# Patient Record
Sex: FEMALE | Race: WHITE | URBAN - METROPOLITAN AREA
[De-identification: names, ages, dates, MRNs, and addresses within clinical notes are randomized per-mention and may not be internally consistent; named-entity substitution may affect disease eponyms.]

---

## 2018-11-07 ENCOUNTER — EMERGENCY (EMERGENCY)
Facility: HOSPITAL | Age: 83
LOS: 0 days | Discharge: HOME | End: 2018-11-07
Attending: EMERGENCY MEDICINE | Admitting: EMERGENCY MEDICINE
Payer: MEDICARE

## 2018-11-07 VITALS
DIASTOLIC BLOOD PRESSURE: 65 MMHG | SYSTOLIC BLOOD PRESSURE: 144 MMHG | HEART RATE: 79 BPM | OXYGEN SATURATION: 98 % | TEMPERATURE: 98 F | RESPIRATION RATE: 16 BRPM

## 2018-11-07 VITALS
SYSTOLIC BLOOD PRESSURE: 172 MMHG | WEIGHT: 125 LBS | HEART RATE: 94 BPM | DIASTOLIC BLOOD PRESSURE: 75 MMHG | TEMPERATURE: 97 F | OXYGEN SATURATION: 93 % | RESPIRATION RATE: 15 BRPM | HEIGHT: 56 IN

## 2018-11-07 DIAGNOSIS — R11.10 VOMITING, UNSPECIFIED: ICD-10-CM

## 2018-11-07 DIAGNOSIS — T18.128A FOOD IN ESOPHAGUS CAUSING OTHER INJURY, INITIAL ENCOUNTER: ICD-10-CM

## 2018-11-07 DIAGNOSIS — K44.9 DIAPHRAGMATIC HERNIA WITHOUT OBSTRUCTION OR GANGRENE: ICD-10-CM
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
## 2018-11-07 LAB
ALBUMIN SERPL ELPH-MCNC: 4 G/DL — SIGNIFICANT CHANGE UP (ref 3.3–5)
ALP SERPL-CCNC: 86 U/L — SIGNIFICANT CHANGE UP (ref 40–120)
ALT FLD-CCNC: 25 U/L — SIGNIFICANT CHANGE UP (ref 12–78)
ANION GAP SERPL CALC-SCNC: 9 MMOL/L — SIGNIFICANT CHANGE UP (ref 5–17)
APTT BLD: 40.7 SEC — HIGH (ref 27.5–36.3)
AST SERPL-CCNC: 18 U/L — SIGNIFICANT CHANGE UP (ref 15–37)
BASOPHILS # BLD AUTO: 0.06 K/UL — SIGNIFICANT CHANGE UP (ref 0–0.2)
BASOPHILS NFR BLD AUTO: 0.9 % — SIGNIFICANT CHANGE UP (ref 0–2)
BILIRUB SERPL-MCNC: 0.4 MG/DL — SIGNIFICANT CHANGE UP (ref 0.2–1.2)
BUN SERPL-MCNC: 14 MG/DL — SIGNIFICANT CHANGE UP (ref 7–23)
CALCIUM SERPL-MCNC: 9.4 MG/DL — SIGNIFICANT CHANGE UP (ref 8.5–10.1)
CHLORIDE SERPL-SCNC: 111 MMOL/L — HIGH (ref 96–108)
CO2 SERPL-SCNC: 24 MMOL/L — SIGNIFICANT CHANGE UP (ref 22–31)
CREAT SERPL-MCNC: 0.89 MG/DL — SIGNIFICANT CHANGE UP (ref 0.5–1.3)
EOSINOPHIL # BLD AUTO: 0.02 K/UL — SIGNIFICANT CHANGE UP (ref 0–0.5)
EOSINOPHIL NFR BLD AUTO: 0.3 % — SIGNIFICANT CHANGE UP (ref 0–6)
GLUCOSE SERPL-MCNC: 167 MG/DL — HIGH (ref 70–99)
HCT VFR BLD CALC: 40.3 % — SIGNIFICANT CHANGE UP (ref 34.5–45)
HGB BLD-MCNC: 13.6 G/DL — SIGNIFICANT CHANGE UP (ref 11.5–15.5)
IMM GRANULOCYTES NFR BLD AUTO: 0.3 % — SIGNIFICANT CHANGE UP (ref 0–1.5)
INR BLD: 1.02 RATIO — SIGNIFICANT CHANGE UP (ref 0.88–1.16)
LYMPHOCYTES # BLD AUTO: 1.05 K/UL — SIGNIFICANT CHANGE UP (ref 1–3.3)
LYMPHOCYTES # BLD AUTO: 15.1 % — SIGNIFICANT CHANGE UP (ref 13–44)
MCHC RBC-ENTMCNC: 31.9 PG — SIGNIFICANT CHANGE UP (ref 27–34)
MCHC RBC-ENTMCNC: 33.7 GM/DL — SIGNIFICANT CHANGE UP (ref 32–36)
MCV RBC AUTO: 94.4 FL — SIGNIFICANT CHANGE UP (ref 80–100)
MONOCYTES # BLD AUTO: 0.29 K/UL — SIGNIFICANT CHANGE UP (ref 0–0.9)
MONOCYTES NFR BLD AUTO: 4.2 % — SIGNIFICANT CHANGE UP (ref 2–14)
NEUTROPHILS # BLD AUTO: 5.53 K/UL — SIGNIFICANT CHANGE UP (ref 1.8–7.4)
NEUTROPHILS NFR BLD AUTO: 79.2 % — HIGH (ref 43–77)
NRBC # BLD: 0 /100 WBCS — SIGNIFICANT CHANGE UP (ref 0–0)
PLATELET # BLD AUTO: 297 K/UL — SIGNIFICANT CHANGE UP (ref 150–400)
POTASSIUM SERPL-MCNC: 4 MMOL/L — SIGNIFICANT CHANGE UP (ref 3.5–5.3)
POTASSIUM SERPL-SCNC: 4 MMOL/L — SIGNIFICANT CHANGE UP (ref 3.5–5.3)
PROT SERPL-MCNC: 8 GM/DL — SIGNIFICANT CHANGE UP (ref 6–8.3)
PROTHROM AB SERPL-ACNC: 11.3 SEC — SIGNIFICANT CHANGE UP (ref 10–12.9)
RBC # BLD: 4.27 M/UL — SIGNIFICANT CHANGE UP (ref 3.8–5.2)
RBC # FLD: 12.8 % — SIGNIFICANT CHANGE UP (ref 10.3–14.5)
SODIUM SERPL-SCNC: 144 MMOL/L — SIGNIFICANT CHANGE UP (ref 135–145)
TROPONIN I SERPL-MCNC: <0.015 NG/ML — SIGNIFICANT CHANGE UP (ref 0.01–0.04)
WBC # BLD: 6.97 K/UL — SIGNIFICANT CHANGE UP (ref 3.8–10.5)
WBC # FLD AUTO: 6.97 K/UL — SIGNIFICANT CHANGE UP (ref 3.8–10.5)

## 2018-11-07 PROCEDURE — 71045 X-RAY EXAM CHEST 1 VIEW: CPT | Mod: 26

## 2018-11-07 PROCEDURE — 93010 ELECTROCARDIOGRAM REPORT: CPT

## 2018-11-07 PROCEDURE — 99285 EMERGENCY DEPT VISIT HI MDM: CPT

## 2018-11-07 RX ORDER — SODIUM CHLORIDE 9 MG/ML
1000 INJECTION INTRAMUSCULAR; INTRAVENOUS; SUBCUTANEOUS ONCE
Qty: 0 | Refills: 0 | Status: COMPLETED | OUTPATIENT
Start: 2018-11-07 | End: 2018-11-07

## 2018-11-07 RX ORDER — GLUCAGON INJECTION, SOLUTION 0.5 MG/.1ML
1 INJECTION, SOLUTION SUBCUTANEOUS ONCE
Qty: 0 | Refills: 0 | Status: COMPLETED | OUTPATIENT
Start: 2018-11-07 | End: 2018-11-07

## 2018-11-07 RX ORDER — ONDANSETRON 8 MG/1
4 TABLET, FILM COATED ORAL ONCE
Qty: 0 | Refills: 0 | Status: COMPLETED | OUTPATIENT
Start: 2018-11-07 | End: 2018-11-07

## 2018-11-07 RX ADMIN — GLUCAGON INJECTION, SOLUTION 1 MILLIGRAM(S): 0.5 INJECTION, SOLUTION SUBCUTANEOUS at 13:09

## 2018-11-07 RX ADMIN — ONDANSETRON 4 MILLIGRAM(S): 8 TABLET, FILM COATED ORAL at 13:13

## 2018-11-07 RX ADMIN — SODIUM CHLORIDE 1000 MILLILITER(S): 9 INJECTION INTRAMUSCULAR; INTRAVENOUS; SUBCUTANEOUS at 13:09

## 2018-11-07 NOTE — ED PROVIDER NOTE - TEMPLATE, MLM
In the next few weeks you may receive a Press Ganey survey regarding your most recent clinic visit with us.  Please take a few moments out of your day to accurately evaluate your visit.  We strive to provide you with the best medical care and hope you are happy with our services 100% of the time.  We consider any rating lower than a 9/10 unacceptable. If you believe you would rate our clinic less than this please let us know how we can improve.  Again, thank you for your time and we look forward to your next visit.           We want to give the best care possible. If you receive a Press Ganey survey from our office, please take the time to fill out the survey and return it in the envelope provided. Your feedback helps us know how we are doing and we really appreciate it. Thank you.      
General

## 2018-11-07 NOTE — ED ADULT NURSE REASSESSMENT NOTE - COMFORT CARE
3pm rounding complete, vitals done no other assistance needed
plan of care explained/warm blanket provided

## 2018-11-07 NOTE — ED ADULT NURSE NOTE - NSIMPLEMENTINTERV_GEN_ALL_ED
Implemented All Fall with Harm Risk Interventions:  [place] to call system. Call bell, personal items and telephone within reach. Instruct patient to call for assistance. Room bathroom lighting operational. Non-slip footwear when patient is off stretcher. Physically safe environment: no spills, clutter or unnecessary equipment. Stretcher in lowest position, wheels locked, appropriate side rails in place. Provide visual cue, wrist band, yellow gown, etc. Monitor gait and stability. Monitor for mental status changes and reorient to person, place, and time. Review medications for side effects contributing to fall risk. Reinforce activity limits and safety measures with patient and family. Provide visual clues: red socks.

## 2018-11-07 NOTE — ED PROVIDER NOTE - OBJECTIVE STATEMENT
88 y/o female with a PMHx of DM, hiatal hernia presents to the ED c/o emesis today. Pt states that over last few days pt has been unable to swallow after taking a few bites of food. Pt states it "feels like the esophagus and stomach is closed". Pt states if she rested several hours, she would then be able to eat again. However, this morning pt states she took two bites of her apricot for breakfast and was immediately not able to swallow. Pt then began to vomit the apricot and white fluid. Pt states she has had chest discomfort since last night. Pt is now infrequently spitting up in the ED and states she filled 3-4 cups of spitup prior to arrival. 88 y/o female with a PMHx of DM, hiatal hernia presents to the ED c/o emesis today. Pt states that over last few days pt has been unable to swallow after taking a few bites of food. Pt states it "feels like the esophagus and stomach is closed". Pt states if she rested several hours, she would then be able to eat again. However, this morning pt states she took two bites of her apricot for breakfast and was immediately not able to swallow. Pt then began to vomit the apricot and white fluid. Pt is now infrequently spitting up in the ED and states she filled 3-4 cups of spitup prior to arrival.  Hx of similar, was supposed to f/u w/ GI but has not.  No SOB, fever, AP, d/c.

## 2018-11-07 NOTE — ED ADULT NURSE NOTE - OBJECTIVE STATEMENT
pt was eating a apricot and felt a piece got stuck and vomited. Pt know having hard time swallowing and spitting up clear liquid.

## 2018-11-07 NOTE — ED ADULT NURSE REASSESSMENT NOTE - NS ED NURSE REASSESS COMMENT FT1
Pt states she can know swallow her saliva and was able to swallow water without vomiting. Dr. Sahu made aware. Will continue to monitor.

## 2018-11-07 NOTE — ED PROVIDER NOTE - NS_ ATTENDINGSCRIBEDETAILS _ED_A_ED_FT
The scribe's documentation has been prepared under my direction and personally reviewed by me in its entirety. I confirm that the note above accurately reflects all work, treatment, procedures, and medical decision-making performed by me.  Dany Gaming MD

## 2018-11-07 NOTE — ED STATDOCS - PROGRESS NOTE DETAILS
Rell OCHOA for Dr. Douglas: 88 y/o female with PMHx of hiatal hernia, DM on metformin presents to the ED c/o emesis starting around 7:15am. Pt states she has a hx of hiatal hernia a few years ago and since then when she eats she feels like the food cannot pass through. This morning at 7:15am she was eating an apricot and felt like the apricot would not go down. Since then pt has not been able to swallow anything and keeps on spitting up bile. GI- Dr. Malik. Will send pt to main ED for further evaluation.

## 2018-11-07 NOTE — ED STATDOCS - NS_ ATTENDINGSCRIBEDETAILS _ED_A_ED_FT
I, Tarun Douglas MD,  performed the initial face to face bedside interview with this patient regarding history of present illness, review of symptoms and relevant past medical, social and family history.  I completed an independent physical examination.    The history, relevant review of systems, past medical and surgical history, medical decision making, and physical examination was documented by the scribe in my presence and I attest to the accuracy of the documentation.

## 2020-10-10 ENCOUNTER — EMERGENCY (EMERGENCY)
Facility: HOSPITAL | Age: 85
LOS: 0 days | Discharge: HOME | End: 2020-10-10
Attending: EMERGENCY MEDICINE
Payer: MEDICARE

## 2020-10-10 VITALS
SYSTOLIC BLOOD PRESSURE: 146 MMHG | RESPIRATION RATE: 16 BRPM | OXYGEN SATURATION: 98 % | DIASTOLIC BLOOD PRESSURE: 78 MMHG | HEART RATE: 78 BPM | TEMPERATURE: 98 F

## 2020-10-10 VITALS — WEIGHT: 110.01 LBS | HEIGHT: 56 IN

## 2020-10-10 DIAGNOSIS — M17.0 BILATERAL PRIMARY OSTEOARTHRITIS OF KNEE: ICD-10-CM

## 2020-10-10 DIAGNOSIS — K44.9 DIAPHRAGMATIC HERNIA WITHOUT OBSTRUCTION OR GANGRENE: ICD-10-CM

## 2020-10-10 DIAGNOSIS — M19.011 PRIMARY OSTEOARTHRITIS, RIGHT SHOULDER: ICD-10-CM

## 2020-10-10 DIAGNOSIS — K62.3 RECTAL PROLAPSE: ICD-10-CM

## 2020-10-10 DIAGNOSIS — R73.03 PREDIABETES: ICD-10-CM

## 2020-10-10 PROCEDURE — 99282 EMERGENCY DEPT VISIT SF MDM: CPT

## 2020-10-10 PROCEDURE — 99283 EMERGENCY DEPT VISIT LOW MDM: CPT

## 2020-10-10 NOTE — ED ADULT NURSE NOTE - CHIEF COMPLAINT QUOTE
pt presents to ED c/o "ball came from her anus," when she tried to defecate this morning, possible rectal prolapse. Denies any abd pain, NVD.  [facility] [phone]

## 2020-10-10 NOTE — ED PROVIDER NOTE - OBJECTIVE STATEMENT
89 y/o female with PMHx of borderline diabetes, OA of R shoulder and knees, hiatal hernia presents to the ED c/o rectal prolapse. At 8:15 this morning, pt reports urgent need to defecate. Pt reports "part of my rectum came out, golf ball size." Reports she wiped blood for 15 minutes. She is not in any pain at this time. Last BM was yesterday. Denies fever, nausea, abdominal pain, recent weight loss, pain, decreased appetite, or recent constipation. Last PO intake 7:15 this morning. PCP: Dr. Tony Forrest. Pt is not on blood thinners or aspirin. NKDA. No recent known sick contacts.

## 2020-10-10 NOTE — ED ADULT TRIAGE NOTE - CHIEF COMPLAINT QUOTE
pt presents to ED c/o "ball came from her anus," when she tried to defecate this morning, possible rectal prolapse. Denies any abd pain, NVD.  [facility] [phone] atraumatic/normal range of motion/neck supple/motor intact

## 2020-10-10 NOTE — ED PROVIDER NOTE - PATIENT PORTAL LINK FT
You can access the FollowMyHealth Patient Portal offered by [facility] by registering at the following website: [email or web address]. By joining [employer]’s FollowMyHealth portal, you will also be able to view your health information using other applications (apps) compatible with our system.

## 2020-10-10 NOTE — ED PROVIDER NOTE - CLINICAL SUMMARY MEDICAL DECISION MAKING FREE TEXT BOX
Pt presents ambulatory to ED regarding rectal prolapse. +resolved, self reduced prior to ED evaluation. PE unremarkable. Pt without active complaint at present. Will d/c with instructions: increase fiber, laxative, PCP office f/u.

## 2020-10-10 NOTE — ED ADULT NURSE REASSESSMENT NOTE - NS ED NURSE REASSESS COMMENT FT1
pt provided indepth discharge instructions, questions encouraged and answered. pt provided MD information for follow up and verbalized understanding,  Nithya pt friend contacted and on way to  pt. pt stable with normal vitals discharged to home.

## 2020-10-10 NOTE — ED PROVIDER NOTE - NSFOLLOWUPINSTRUCTIONS_ED_ALL_ED_FT
Continue your regular medications as per routine.  Drink plenty of oral fluids.  Include more fiber in your diet.  Don't strain during BMs.  Avoid sitting for prolonged periods of time.  Follow up this upcoming week with [place]-Rectal specialist as listed below.  Follow up this upcoming week with your own regular doctor.      ED evaluation and management discussed with the patient and family (if available) in detail.  Close PMD follow up encouraged.  Strict ED return instructions discussed in detail and patient given the opportunity to ask any questions about their discharge diagnosis and instructions. Patient verbalized understanding.        Rectal Prolapse    WHAT YOU NEED TO KNOW:    A rectal prolapse is a condition that causes your rectum to come through your anus. The rectum is the end of your bowel. A prolapse may happen during a bowel movement. A prolapse may happen more often in women after childbirth or who are older than 50 years.     DISCHARGE INSTRUCTIONS:    Call 911 for any of the following:   •You have trouble breathing.       •Your heart is beating faster than usual.       Return to the emergency department if:   •You have severe pain in your abdomen.       •Your abdomen looks bigger than usual.       •You see a large amount of blood in your bowel movement.       •Blood from your rectum soaks through your underwear.       Contact your healthcare provider if:   •You have a fever.       •You have nausea or are vomiting.       •You see larger amounts of blood in your bowel movement than before.       •You have questions or concerns about your condition or care.       Medicines: You may need any of the following:   •Stool softeners help prevent constipation.       •Laxatives help your intestines relax and loosen to prevent constipation.       •Take your medicine as directed. Contact your healthcare provider if you think your medicine is not helping or if you have side effects. Tell him or her if you are allergic to any medicine. Keep a list of the medicines, vitamins, and herbs you take. Include the amounts, and when and why you take them. Bring the list or the pill bottles to follow-up visits. Carry your medicine list with you in case of an emergency.      Follow up with your healthcare provider as directed: Write down your questions so you remember to ask them during your visits.     How to do a manual reduction: Manual reduction is a procedure you can do to place your rectum back inside of the anus. Your healthcare provider will show you how to do a manual reduction. You may need a family member to help you with manual reduction. The following are general steps to follow. Your healthcare provider may give you specific steps to follow.   •Your healthcare provider may tell you to apply sugar to your rectum before manual reduction. This may help decrease the swelling of your rectum, and make it easier to put back inside your anus. Ask your healthcare provider about applying sugar to your rectum.       •Lie on your back with your knees bent.       •Wash your hands and put on gloves. Lubricate your glove with petroleum jelly.       •Hold your rectum on both sides of the anus. Gently apply firm, steady pressure on your rectum and push it into your anus. You may need to apply pressure for several minutes if the bowel is swollen. Inspect your anus. You can use a mirror or have your family member inspect your anus. You should not see the rectum. If a prolapse happens again, you can repeat manual reduction.       •You can hold the rectum in place with gauze and tape across your buttocks. Before you apply gauze, place a quarter size amount of petroleum jelly on the gauze. The petroleum jelly will prevent the gauze from sticking to your rectum. Remove the gauze as directed by your healthcare provider.       Prevent a rectal prolapse:   •Eat more high-fiber foods. This may help decrease constipation by adding bulk and softness to your bowel movements. Your healthcare provider can help you create a meal plan that includes high-fiber foods. High fiber foods include fruit, vegetables, whole-grain breads, and cooked beans.       •Increase the amount of liquid you drink. Liquids can help keep your bowel movements soft and prevent constipation. Ask your healthcare provider how much liquid you should drink each day.       •Exercise your pelvic muscles. Kegel exercises strengthen the pelvic muscles. These exercises involve tightening and relaxing vaginal and rectal muscles. Kegel exercises can make the rectal muscles stronger and improve bowel control. Ask your healthcare provider for more information on how to do kegel exercises.       •Do not sit for long amounts of time. You may put too much pressure on your anus. Pressure on your anus may cause a rectal prolapse.

## 2020-10-10 NOTE — ED ADULT NURSE NOTE - NSIMPLEMENTINTERV_GEN_ALL_ED
Implemented All Fall Risk Interventions:  [place] to call system. Call bell, personal items and telephone within reach. Instruct patient to call for assistance. Room bathroom lighting operational. Non-slip footwear when patient is off stretcher. Physically safe environment: no spills, clutter or unnecessary equipment. Stretcher in lowest position, wheels locked, appropriate side rails in place. Provide visual cue, wrist band, yellow gown, etc. Monitor gait and stability. Monitor for mental status changes and reorient to person, place, and time. Review medications for side effects contributing to fall risk. Reinforce activity limits and safety measures with patient and family.

## 2020-10-10 NOTE — ED ADULT NURSE NOTE - OBJECTIVE STATEMENT
pt presents to ED from home, pt alert and orientedx4, VSs afebrule, pt states she went to pass a bowel movment this AM and a mass of tissue protuded out of her rectum, pt states it did not fall out and she believes it may have gonne back in. pt states she has had protrusions of mass in the past but not as large. pt denies hemorrhoids. pt denies fall trauma fever. pt states there was a scant amount of blood when the mass protruded out, safety maintained will continue to monitor

## 2021-02-26 ENCOUNTER — EMERGENCY (EMERGENCY)
Facility: HOSPITAL | Age: 86
LOS: 0 days | Discharge: HOME | End: 2021-02-26
Attending: EMERGENCY MEDICINE
Payer: MEDICARE

## 2021-02-26 VITALS
DIASTOLIC BLOOD PRESSURE: 56 MMHG | TEMPERATURE: 98 F | SYSTOLIC BLOOD PRESSURE: 140 MMHG | RESPIRATION RATE: 18 BRPM | OXYGEN SATURATION: 100 % | HEART RATE: 84 BPM

## 2021-02-26 VITALS — WEIGHT: 106.92 LBS | HEIGHT: 56 IN

## 2021-02-26 DIAGNOSIS — K62.3 RECTAL PROLAPSE: ICD-10-CM

## 2021-02-26 DIAGNOSIS — R73.03 PREDIABETES: ICD-10-CM

## 2021-02-26 DIAGNOSIS — M19.90 UNSPECIFIED OSTEOARTHRITIS, UNSPECIFIED SITE: ICD-10-CM

## 2021-02-26 PROCEDURE — 99282 EMERGENCY DEPT VISIT SF MDM: CPT

## 2021-02-26 NOTE — ED PROVIDER NOTE - GASTROINTESTINAL, MLM
Abdomen soft, non-tender, no guarding. +bowel sounds. Rectal exam visualized, no prolapse, already reduced, no blood.

## 2021-02-26 NOTE — ED PROVIDER NOTE - CARE PROVIDERS DIRECT ADDRESSES
[email or web address]

## 2021-02-26 NOTE — ED PROVIDER NOTE - OBJECTIVE STATEMENT
89 y/o female with PMHx of arthritis, hiatal hernia, OA, borderline DM presents to the ED c/o rectal prolapse. Pt states this has happened twice in since last September. Has not headache BM in a few days, today attempt to have BM, and felt rectum prolapse as well, x1 hour ago. Attempted to push it back in, but was unable to. Denies any pain at this time, fever, abd pain, chest pain, no known COVID exposure, recent loss of smell or taste. PCP: Dr. Forrest. Pt does not follow with current GI.

## 2021-02-26 NOTE — ED PROVIDER NOTE - CONSTITUTIONAL, MLM
Well appearing elderly white female, awake, alert, oriented to person, place, time/situation and in no acute distress. normal...

## 2021-02-26 NOTE — ED PROVIDER NOTE - CARE PROVIDER_API CALL
Alex Madrid)  ColonRectal Surgery; Surgery  [address] [address]  Phone: [phone]  Fax: [phone]  Follow Up Time:     Francisco Alan)  ColonRectal Surgery; Surgery  [address], [address]  [place] [address]  Phone: [phone]  Fax: [phone]  Follow Up Time:

## 2021-02-26 NOTE — ED PROVIDER NOTE - CLINICAL SUMMARY MEDICAL DECISION MAKING FREE TEXT BOX
89 y/o female with history of alleged recurrent rectal prolapse x few months, though each episode self-resolved, ambulatory to ED c/o rectal prolapse, incurred during straining for BM. No abd pain, nausea, vomiting, no current rectal pain. On exam, prolapse self reduced, no bleeding, rectum non-tender. Plan: no acute intervention warranted, DC on cholase with constipation/rectal prolapse instructions, colorectal referral.

## 2021-02-26 NOTE — ED PROVIDER NOTE - ATTENDING CONTRIBUTION TO CARE
I, Daniel Austin MD, personally saw the patient with the PA, and completed the key components of the history and physical exam. I then discussed the management plan with the PA.

## 2021-02-26 NOTE — ED PROVIDER NOTE - NSFOLLOWUPINSTRUCTIONS_ED_ALL_ED_FT
Rectal Prolapse    WHAT YOU NEED TO KNOW:    A rectal prolapse is a condition that causes your rectum to come through your anus. The rectum is the end of your bowel. A prolapse may happen during a bowel movement. A prolapse may happen more often in women after childbirth or who are older than 50 years.     DISCHARGE INSTRUCTIONS:    Call 911 for any of the following:   •You have trouble breathing.       •Your heart is beating faster than usual.       Return to the emergency department if:   •You have severe pain in your abdomen.       •Your abdomen looks bigger than usual.       •You see a large amount of blood in your bowel movement.       •Blood from your rectum soaks through your underwear.       Contact your healthcare provider if:   •You have a fever.       •You have nausea or are vomiting.       •You see larger amounts of blood in your bowel movement than before.       •You have questions or concerns about your condition or care.       Medicines: You may need any of the following:   •Stool softeners help prevent constipation.       •Laxatives help your intestines relax and loosen to prevent constipation.       •Take your medicine as directed. Contact your healthcare provider if you think your medicine is not helping or if you have side effects. Tell him or her if you are allergic to any medicine. Keep a list of the medicines, vitamins, and herbs you take. Include the amounts, and when and why you take them. Bring the list or the pill bottles to follow-up visits. Carry your medicine list with you in case of an emergency.      Follow up with your healthcare provider as directed: Write down your questions so you remember to ask them during your visits.     How to do a manual reduction: Manual reduction is a procedure you can do to place your rectum back inside of the anus. Your healthcare provider will show you how to do a manual reduction. You may need a family member to help you with manual reduction. The following are general steps to follow. Your healthcare provider may give you specific steps to follow.   •Your healthcare provider may tell you to apply sugar to your rectum before manual reduction. This may help decrease the swelling of your rectum, and make it easier to put back inside your anus. Ask your healthcare provider about applying sugar to your rectum.       •Lie on your back with your knees bent.       •Wash your hands and put on gloves. Lubricate your glove with petroleum jelly.       •Hold your rectum on both sides of the anus. Gently apply firm, steady pressure on your rectum and push it into your anus. You may need to apply pressure for several minutes if the bowel is swollen. Inspect your anus. You can use a mirror or have your family member inspect your anus. You should not see the rectum. If a prolapse happens again, you can repeat manual reduction.       •You can hold the rectum in place with gauze and tape across your buttocks. Before you apply gauze, place a quarter size amount of petroleum jelly on the gauze. The petroleum jelly will prevent the gauze from sticking to your rectum. Remove the gauze as directed by your healthcare provider.       Prevent a rectal prolapse:   •Eat more high-fiber foods. This may help decrease constipation by adding bulk and softness to your bowel movements. Your healthcare provider can help you create a meal plan that includes high-fiber foods. High fiber foods include fruit, vegetables, whole-grain breads, and cooked beans.       •Increase the amount of liquid you drink. Liquids can help keep your bowel movements soft and prevent constipation. Ask your healthcare provider how much liquid you should drink each day.       •Exercise your pelvic muscles. Kegel exercises strengthen the pelvic muscles. These exercises involve tightening and relaxing vaginal and rectal muscles. Kegel exercises can make the rectal muscles stronger and improve bowel control. Ask your healthcare provider for more information on how to do kegel exercises.       •Do not sit for long amounts of time. You may put too much pressure on your anus. Pressure on your anus may cause a rectal prolapse.          Constipation    WHAT YOU NEED TO KNOW:    Constipation is when you have hard, dry bowel movements, or you go longer than usual between bowel movements.     DISCHARGE INSTRUCTIONS:    Call your doctor if:   •You have blood in your bowel movements.      •You have a fever and abdominal pain with the constipation.      •Your constipation gets worse.       •You start to vomit.      •You have questions or concerns about your condition or care.      Medicines:   •Medicine such as a laxative may help relax and loosen your intestines to help you have a bowel movement. Your provider may recommend you only use laxatives for a short time. Long-term use may make your bowels dependent on the medicine.      •Take your medicine as directed. Contact your healthcare provider if you think your medicine is not helping or if you have side effects. Tell him of her if you are allergic to any medicine. Keep a list of the medicines, vitamins, and herbs you take. Include the amounts, and when and why you take them. Bring the list or the pill bottles to follow-up visits. Carry your medicine list with you in case of an emergency.      Relieve constipation:   •A suppository may be used to help soften your bowel movements. This may make them easier to pass. A suppository is guided into your rectum through your anus.  Suppository for Constipation           •An enema is liquid medicine used to clear bowel movement from your rectum. The medicine is put into your rectum through your anus.  Enemas           Prevent constipation:   •Drink liquids as directed. You may need to drink extra liquids to help soften and move your bowels. Ask how much liquid to drink each day and which liquids are best for you.       •Eat high-fiber foods. This may help decrease constipation by adding bulk to your bowel movements. High-fiber foods include fruit, vegetables, whole-grain breads and cereals, and beans. Your healthcare provider or dietitian can help you create a high-fiber meal plan. Your provider may also recommend a fiber supplement if you cannot get enough fiber from food.              •Exercise regularly. Regular physical activity can help stimulate your intestines. Walking is a good exercise to prevent or relieve constipation. Ask which exercises are best for you.  Walking for Exercise           •Schedule a time each day to have a bowel movement. This may help train your body to have regular bowel movements. Bend forward while you are on the toilet to help move the bowel movement out. Sit on the toilet for at least 10 minutes, even if you do not have a bowel movement.       •Talk to your healthcare provider about your medicines. Certain medicines, such as opioids, can cause constipation. Your provider may be able to make medicine changes. For example, he or she may change the kind of medicine, or change when you take it.      Follow up with your healthcare provider as directed: Write down your questions so you remember to ask them during your visits.

## 2021-02-26 NOTE — ED ADULT TRIAGE NOTE - CHIEF COMPLAINT QUOTE
PT to ED from home for rectal prolapse. PT reports having this happen 2x since september. Denies pain.

## 2021-02-26 NOTE — ED PROVIDER NOTE - PROGRESS NOTE DETAILS
89 yo female presents with rectal prolapse. Pt states that she had this in the past and was given f/u with colorectal but was unable to follow up. Pt states she drank a lot of water this morning and had a large BM prior to the reoccurring rectal prolapse. Currently the prolapse have self resolved. Direction and plan given to pt and her information placed in the referral book to receive an earlier appointment for Colorectal. Pt aware and agrees with plan. -Tony Chavarria PA-C

## 2021-02-26 NOTE — ED ADULT NURSE NOTE - NSIMPLEMENTINTERV_GEN_ALL_ED
Implemented All Fall with Harm Risk Interventions:  [place] to call system. Call bell, personal items and telephone within reach. Instruct patient to call for assistance. Room bathroom lighting operational. Non-slip footwear when patient is off stretcher. Physically safe environment: no spills, clutter or unnecessary equipment. Stretcher in lowest position, wheels locked, appropriate side rails in place. Provide visual cue, wrist band, yellow gown, etc. Monitor gait and stability. Monitor for mental status changes and reorient to person, place, and time. Review medications for side effects contributing to fall risk. Reinforce activity limits and safety measures with patient and family. Provide visual clues: red socks.

## 2021-02-26 NOTE — ED ADULT NURSE NOTE - OBJECTIVE STATEMENT
Patient reports recurrent rectal prolapse with bowel movement one hour prior to arrival, resolved on evaluation.  No acute distress.

## 2021-02-26 NOTE — ED PROVIDER NOTE - PATIENT PORTAL LINK FT
You can access the FollowMyHealth Patient Portal offered by [facility] by registering at the following website: [email or web address]. By joining [facility]’s FollowMyHealth portal, you will also be able to view your health information using other applications (apps) compatible with our system.

## 2021-03-02 ENCOUNTER — APPOINTMENT (OUTPATIENT)
Dept: COLORECTAL SURGERY | Facility: CLINIC | Age: 86
End: 2021-03-02
Payer: MEDICARE

## 2021-03-02 VITALS
WEIGHT: 105 LBS | RESPIRATION RATE: 16 BRPM | HEIGHT: 55 IN | HEART RATE: 79 BPM | TEMPERATURE: 97.3 F | BODY MASS INDEX: 24.3 KG/M2 | SYSTOLIC BLOOD PRESSURE: 177 MMHG | DIASTOLIC BLOOD PRESSURE: 70 MMHG

## 2021-03-02 DIAGNOSIS — M40.205 UNSPECIFIED KYPHOSIS, THORACOLUMBAR REGION: ICD-10-CM

## 2021-03-02 DIAGNOSIS — K62.3 RECTAL PROLAPSE: ICD-10-CM

## 2021-03-02 PROBLEM — Z00.00 ENCOUNTER FOR PREVENTIVE HEALTH EXAMINATION: Status: ACTIVE | Noted: 2021-03-02

## 2021-03-02 PROCEDURE — 46600 DIAGNOSTIC ANOSCOPY SPX: CPT

## 2021-03-02 PROCEDURE — 99203 OFFICE O/P NEW LOW 30 MIN: CPT

## 2021-03-02 NOTE — ASSESSMENT
[FreeTextEntry1] : Ms. Hu presents to the office, accompanied by her neighbor, for reports of recurrent rectal prolapse over the course of the last 6 months.  Her neighbor states that the first episode in October was isolated and symptoms did not recur as patient used a stool softener and adhere to a healthy diet.  The recurrence this month, she attributes to patient being under more stress as her bathroom was remodeled and was not eating as well.  The neighbor has come over to reduce the prolapse for her in the past and agrees that it does not get incarcerated.  Given Yumiko's frailty and advanced age, I do not believe she is a good surgical candidate.  I advised the patient to continue with a high-fiber diet and use MiraLAX nightly for stool softening in order to avoid constipation.  Methods for reduction of the prolapse were also discussed including having the patient lay on her left side or back while attempting reduction.  If this is not successful, simple table sugar can be used to reduce the mucosal edema and facilitate reduction.\par If patient notices that the rectal prolapse worsens in terms of frequency of occurrence despite the above methods, she may indeed require surgery in the form of a perineal proctosigmoidectomy.  The downside to the surgery in a patient of her age is the removal of the rectum, which is the storage container for stool.  Post procedure function would include fecal urgency, frequency and likely incontinence as she is unable to move quickly enough to reach a toilet and her pelvic floor musculature is unlikely to be able to strengthen over time to compensate for the low colorectal anastomosis.\par On completion of our visit, all questions were addressed, and she is to return to the office for worsening symptoms.

## 2021-03-02 NOTE — HISTORY OF PRESENT ILLNESS
[FreeTextEntry1] : [id number] presents to office, accompanied by her neighbor, after 2 recent  ED visits over the last 6 months for constipation and rectal prolapse. At each visit, the prolapse had already self reduced. She denies prolapse with sneeze, cough or ambulation. She has been using stool softeners to avoid constipation and straining. She is here to discuss further treatment options versus plans.  She denies any rectal bleeding except when her rectum is prolapsed.  She has never had a colonoscopy and is adamant about not undergoing one at this point in her life.

## 2021-03-02 NOTE — CONSULT LETTER
[Dear  ___] : Dear  [unfilled], [Consult Letter:] : I had the pleasure of evaluating your patient, [unfilled]. [Please see my note below.] : Please see my note below. [Consult Closing:] : Thank you very much for allowing me to participate in the care of this patient.  If you have any questions, please do not hesitate to contact me. [Sincerely,] : Sincerely, [FreeTextEntry3] : Frances Gaona MD\par

## 2021-03-02 NOTE — PHYSICAL EXAM
[Normal rectal exam] : exam was normal [Reduce Spontaneously] : a spontaneously reducible (grade II) [Lax] : was lax [None] : there was no rectal mass  [No Rash or Lesion] : No rash or lesion [Alert] : alert [Oriented to Person] : oriented to person [Oriented to Place] : oriented to place [Oriented to Time] : oriented to time [Calm] : calm [Skin Tags] : there were no residual hemorrhoidal skin tags seen [Gross Blood] : no gross blood [de-identified] : No apparent distress [de-identified] : Normocephalic atraumatic [de-identified] : Moving all extremities x4

## 2021-03-17 NOTE — ED ADULT TRIAGE NOTE - HEIGHT IN CM
Chart reviewed; 30w2d;     Spoke with patient who reports increased white vaginal discharge x 2 days, denies itching or irritation. Patient also reports occas cramp in abdomen, thought she was hungry, ate but not resolved. Patient unsure of how often the cramping is occurring, very in-frequent at this time. Patient is at work as a caregiver. Enc patient to try and rest if poss, increase water intake and monitor for the cramping, if > 6 in 1 hour, call after hours triage. Patient denies leaking of fluid and or vaginal bleeding, reports baby is active. No additional questions or concerns.   
OB?  Yes,  30w2d  Pharmacy Verified? Yes   Reason for Call: pt states she is having braxont tapia, she also has a white discharge, states its a \"shock pain\",  hasnt happened since she ate seomthing, stomach is \"penny hard\", discharge. Fetal movement +   Best form of Contact:      Cell Phone:   Telephone Information:   Mobile [phone]     Okay to leave a detailed message regarding call? Yes      
[phone]

## 2021-07-14 ENCOUNTER — INPATIENT (INPATIENT)
Facility: HOSPITAL | Age: 86
LOS: 1 days | Discharge: NURSING, REHAB OR LONG-TERM CARE | DRG: 563 | End: 2021-07-16
Attending: FAMILY MEDICINE | Admitting: INTERNAL MEDICINE
Payer: MEDICARE

## 2021-07-14 VITALS
SYSTOLIC BLOOD PRESSURE: 107 MMHG | TEMPERATURE: 98 F | OXYGEN SATURATION: 100 % | HEART RATE: 75 BPM | DIASTOLIC BLOOD PRESSURE: 84 MMHG | WEIGHT: 100.09 LBS | RESPIRATION RATE: 18 BRPM | HEIGHT: 56 IN

## 2021-07-14 DIAGNOSIS — S42.309A UNSPECIFIED FRACTURE OF SHAFT OF HUMERUS, UNSPECIFIED ARM, INITIAL ENCOUNTER FOR CLOSED FRACTURE: ICD-10-CM

## 2021-07-14 LAB
ALBUMIN SERPL ELPH-MCNC: 3.5 G/DL — SIGNIFICANT CHANGE UP (ref 3.3–5)
ALP SERPL-CCNC: 89 U/L — SIGNIFICANT CHANGE UP (ref 40–120)
ALT FLD-CCNC: 30 U/L — SIGNIFICANT CHANGE UP (ref 12–78)
ANION GAP SERPL CALC-SCNC: 3 MMOL/L — LOW (ref 5–17)
APTT BLD: 36.2 SEC — HIGH (ref 27.5–35.5)
AST SERPL-CCNC: 25 U/L — SIGNIFICANT CHANGE UP (ref 15–37)
BASOPHILS # BLD AUTO: 0.06 K/UL — SIGNIFICANT CHANGE UP (ref 0–0.2)
BASOPHILS NFR BLD AUTO: 0.6 % — SIGNIFICANT CHANGE UP (ref 0–2)
BILIRUB SERPL-MCNC: 0.4 MG/DL — SIGNIFICANT CHANGE UP (ref 0.2–1.2)
BUN SERPL-MCNC: 16 MG/DL — SIGNIFICANT CHANGE UP (ref 7–23)
CALCIUM SERPL-MCNC: 8.9 MG/DL — SIGNIFICANT CHANGE UP (ref 8.5–10.1)
CHLORIDE SERPL-SCNC: 103 MMOL/L — SIGNIFICANT CHANGE UP (ref 96–108)
CO2 SERPL-SCNC: 29 MMOL/L — SIGNIFICANT CHANGE UP (ref 22–31)
CREAT SERPL-MCNC: 0.95 MG/DL — SIGNIFICANT CHANGE UP (ref 0.5–1.3)
EOSINOPHIL # BLD AUTO: 0.06 K/UL — SIGNIFICANT CHANGE UP (ref 0–0.5)
EOSINOPHIL NFR BLD AUTO: 0.6 % — SIGNIFICANT CHANGE UP (ref 0–6)
GLUCOSE SERPL-MCNC: 253 MG/DL — HIGH (ref 70–99)
HCT VFR BLD CALC: 35.2 % — SIGNIFICANT CHANGE UP (ref 34.5–45)
HGB BLD-MCNC: 11.7 G/DL — SIGNIFICANT CHANGE UP (ref 11.5–15.5)
IMM GRANULOCYTES NFR BLD AUTO: 0.4 % — SIGNIFICANT CHANGE UP (ref 0–1.5)
INR BLD: 1.06 RATIO — SIGNIFICANT CHANGE UP (ref 0.88–1.16)
LYMPHOCYTES # BLD AUTO: 1.12 K/UL — SIGNIFICANT CHANGE UP (ref 1–3.3)
LYMPHOCYTES # BLD AUTO: 11.2 % — LOW (ref 13–44)
MCHC RBC-ENTMCNC: 31.9 PG — SIGNIFICANT CHANGE UP (ref 27–34)
MCHC RBC-ENTMCNC: 33.2 GM/DL — SIGNIFICANT CHANGE UP (ref 32–36)
MCV RBC AUTO: 95.9 FL — SIGNIFICANT CHANGE UP (ref 80–100)
MONOCYTES # BLD AUTO: 0.65 K/UL — SIGNIFICANT CHANGE UP (ref 0–0.9)
MONOCYTES NFR BLD AUTO: 6.5 % — SIGNIFICANT CHANGE UP (ref 2–14)
NEUTROPHILS # BLD AUTO: 8.06 K/UL — HIGH (ref 1.8–7.4)
NEUTROPHILS NFR BLD AUTO: 80.7 % — HIGH (ref 43–77)
PLATELET # BLD AUTO: 267 K/UL — SIGNIFICANT CHANGE UP (ref 150–400)
POTASSIUM SERPL-MCNC: 4 MMOL/L — SIGNIFICANT CHANGE UP (ref 3.5–5.3)
POTASSIUM SERPL-SCNC: 4 MMOL/L — SIGNIFICANT CHANGE UP (ref 3.5–5.3)
PROT SERPL-MCNC: 6.4 GM/DL — SIGNIFICANT CHANGE UP (ref 6–8.3)
PROTHROM AB SERPL-ACNC: 12.3 SEC — SIGNIFICANT CHANGE UP (ref 10.6–13.6)
RBC # BLD: 3.67 M/UL — LOW (ref 3.8–5.2)
RBC # FLD: 13.2 % — SIGNIFICANT CHANGE UP (ref 10.3–14.5)
SODIUM SERPL-SCNC: 135 MMOL/L — SIGNIFICANT CHANGE UP (ref 135–145)
WBC # BLD: 9.99 K/UL — SIGNIFICANT CHANGE UP (ref 3.8–10.5)
WBC # FLD AUTO: 9.99 K/UL — SIGNIFICANT CHANGE UP (ref 3.8–10.5)

## 2021-07-14 PROCEDURE — 86769 SARS-COV-2 COVID-19 ANTIBODY: CPT

## 2021-07-14 PROCEDURE — 85025 COMPLETE CBC W/AUTO DIFF WBC: CPT

## 2021-07-14 PROCEDURE — 73090 X-RAY EXAM OF FOREARM: CPT | Mod: LT

## 2021-07-14 PROCEDURE — 71250 CT THORAX DX C-: CPT | Mod: 26,MA

## 2021-07-14 PROCEDURE — 73060 X-RAY EXAM OF HUMERUS: CPT | Mod: 26,LT

## 2021-07-14 PROCEDURE — 80053 COMPREHEN METABOLIC PANEL: CPT

## 2021-07-14 PROCEDURE — 73030 X-RAY EXAM OF SHOULDER: CPT | Mod: 26,LT

## 2021-07-14 PROCEDURE — 73060 X-RAY EXAM OF HUMERUS: CPT | Mod: LT

## 2021-07-14 PROCEDURE — 93010 ELECTROCARDIOGRAM REPORT: CPT

## 2021-07-14 PROCEDURE — 99285 EMERGENCY DEPT VISIT HI MDM: CPT

## 2021-07-14 PROCEDURE — 83036 HEMOGLOBIN GLYCOSYLATED A1C: CPT

## 2021-07-14 PROCEDURE — 70450 CT HEAD/BRAIN W/O DYE: CPT | Mod: 26,MA

## 2021-07-14 PROCEDURE — 74176 CT ABD & PELVIS W/O CONTRAST: CPT | Mod: 26,MA

## 2021-07-14 PROCEDURE — 36415 COLL VENOUS BLD VENIPUNCTURE: CPT

## 2021-07-14 PROCEDURE — 81001 URINALYSIS AUTO W/SCOPE: CPT

## 2021-07-14 PROCEDURE — 72125 CT NECK SPINE W/O DYE: CPT | Mod: 26,MA

## 2021-07-14 PROCEDURE — 73080 X-RAY EXAM OF ELBOW: CPT | Mod: 26,LT

## 2021-07-14 PROCEDURE — 87086 URINE CULTURE/COLONY COUNT: CPT

## 2021-07-14 RX ORDER — ACETAMINOPHEN 500 MG
1000 TABLET ORAL ONCE
Refills: 0 | Status: COMPLETED | OUTPATIENT
Start: 2021-07-14 | End: 2021-07-14

## 2021-07-14 RX ADMIN — Medication 1000 MILLIGRAM(S): at 20:11

## 2021-07-14 NOTE — ED PROVIDER NOTE - CARE PLAN
Principal Discharge DX:	Humerus fracture  Secondary Diagnosis:	Olecranon fracture  Secondary Diagnosis:	Difficulty walking

## 2021-07-14 NOTE — ED ADULT TRIAGE NOTE - CHIEF COMPLAINT QUOTE
Patient brought in by EMS for fall off of stoop approx. 4 feet. patient denies head injury. not on blood thinners. complaining of left shoulder and elbow pain swelling noted to elbow and shoulder.

## 2021-07-14 NOTE — ED ADULT TRIAGE NOTE - TEMPERATURE IN CELSIUS (DEGREES C)
Telephone Encounter by Lázaro Arboleda at 10/05/16 02:18 PM     Author:  Lázaro Arboleda Service:  (none) Author Type:       Filed:  10/05/16 02:19 PM Encounter Date:  10/5/2016 Status:  Signed     :  Lázaro Arboleda ()            Left message with # requesting patient to call back to schedule per order.      Revision History        Date/Time User Provider Type Action    > 10/05/16 02:19 PM Lázaro Arboleda  Sign    Attribution information within the note text is not available.             36.4

## 2021-07-14 NOTE — ED ADULT NURSE NOTE - NSIMPLEMENTINTERV_GEN_ALL_ED
Implemented All Fall with Harm Risk Interventions:  [place] to call system. Call bell, personal items and telephone within reach. Instruct patient to call for assistance. Room bathroom lighting operational. Non-slip footwear when patient is off stretcher. Physically safe environment: no spills, clutter or unnecessary equipment. Stretcher in lowest position, wheels locked, appropriate side rails in place. Provide visual cue, wrist band, yellow gown, etc. Monitor gait and stability. Monitor for mental status changes and reorient to person, place, and time. Review medications for side effects contributing to fall risk. Reinforce activity limits and safety measures with patient and family. Provide visual clues: red socks.

## 2021-07-14 NOTE — ED PROVIDER NOTE - MUSCULOSKELETAL, MLM
Spine appears normal, range of motion is not limited. L shoulder TTP. L elbow TTP. Mild edema. Mild left chest wall TTP. Intact distal pulses.

## 2021-07-14 NOTE — ED ADULT NURSE NOTE - NS ED NURSE DISCH DISPOSITION
Admitted Retinoid Dermatitis Aggressive Treatment: I recommended more frequent application of Cetaphil or CeraVe to the areas of dermatitis. I also prescribed a topical steroid for twice daily use until the dermatitis resolves.

## 2021-07-14 NOTE — ED PROVIDER NOTE - OBJECTIVE STATEMENT
89 y/o female with a PMHx of arthritis, hiatal hernia presents to the ED from home for fall down three steps. Pt fell backwards, hitting her left shoulder on the ground. Now c/o left shoulder, left elbow pain. Denies head injury, no LOC. No other complaints at this time.

## 2021-07-14 NOTE — ED ADULT NURSE NOTE - OBJECTIVE STATEMENT
Patient presents to ED s/p mechanical fall at home while getting the mail. Pt report having biopsy done on face after not following up with PCP for sometime due to her sister being ill. Pt states she went to get mail and tripped landing on left elbow. Denies LOC or head injury. No use of blood thinners. Abrasions noted to left elbow, bleeding controlled.

## 2021-07-15 DIAGNOSIS — Z90.710 ACQUIRED ABSENCE OF BOTH CERVIX AND UTERUS: Chronic | ICD-10-CM

## 2021-07-15 LAB
A1C WITH ESTIMATED AVERAGE GLUCOSE RESULT: 6 % — HIGH (ref 4–5.6)
ALBUMIN SERPL ELPH-MCNC: 3.2 G/DL — LOW (ref 3.3–5)
ALP SERPL-CCNC: 69 U/L — SIGNIFICANT CHANGE UP (ref 40–120)
ALT FLD-CCNC: 23 U/L — SIGNIFICANT CHANGE UP (ref 12–78)
ANION GAP SERPL CALC-SCNC: 5 MMOL/L — SIGNIFICANT CHANGE UP (ref 5–17)
APPEARANCE UR: CLEAR — SIGNIFICANT CHANGE UP
AST SERPL-CCNC: 15 U/L — SIGNIFICANT CHANGE UP (ref 15–37)
BASOPHILS # BLD AUTO: 0.04 K/UL — SIGNIFICANT CHANGE UP (ref 0–0.2)
BASOPHILS NFR BLD AUTO: 0.5 % — SIGNIFICANT CHANGE UP (ref 0–2)
BILIRUB SERPL-MCNC: 0.7 MG/DL — SIGNIFICANT CHANGE UP (ref 0.2–1.2)
BILIRUB UR-MCNC: NEGATIVE — SIGNIFICANT CHANGE UP
BUN SERPL-MCNC: 14 MG/DL — SIGNIFICANT CHANGE UP (ref 7–23)
CALCIUM SERPL-MCNC: 8.5 MG/DL — SIGNIFICANT CHANGE UP (ref 8.5–10.1)
CHLORIDE SERPL-SCNC: 103 MMOL/L — SIGNIFICANT CHANGE UP (ref 96–108)
CO2 SERPL-SCNC: 26 MMOL/L — SIGNIFICANT CHANGE UP (ref 22–31)
COLOR SPEC: YELLOW — SIGNIFICANT CHANGE UP
COVID-19 SPIKE DOMAIN AB INTERP: POSITIVE
COVID-19 SPIKE DOMAIN ANTIBODY RESULT: >250 U/ML — HIGH
CREAT SERPL-MCNC: 0.71 MG/DL — SIGNIFICANT CHANGE UP (ref 0.5–1.3)
DIFF PNL FLD: ABNORMAL
EOSINOPHIL # BLD AUTO: 0.02 K/UL — SIGNIFICANT CHANGE UP (ref 0–0.5)
EOSINOPHIL NFR BLD AUTO: 0.3 % — SIGNIFICANT CHANGE UP (ref 0–6)
ESTIMATED AVERAGE GLUCOSE: 126 MG/DL — HIGH (ref 68–114)
GLUCOSE SERPL-MCNC: 104 MG/DL — HIGH (ref 70–99)
GLUCOSE UR QL: NEGATIVE MG/DL — SIGNIFICANT CHANGE UP
HCT VFR BLD CALC: 28.1 % — LOW (ref 34.5–45)
HGB BLD-MCNC: 9.5 G/DL — LOW (ref 11.5–15.5)
IMM GRANULOCYTES NFR BLD AUTO: 0.4 % — SIGNIFICANT CHANGE UP (ref 0–1.5)
KETONES UR-MCNC: NEGATIVE — SIGNIFICANT CHANGE UP
LEUKOCYTE ESTERASE UR-ACNC: ABNORMAL
LYMPHOCYTES # BLD AUTO: 1.12 K/UL — SIGNIFICANT CHANGE UP (ref 1–3.3)
LYMPHOCYTES # BLD AUTO: 15.2 % — SIGNIFICANT CHANGE UP (ref 13–44)
MCHC RBC-ENTMCNC: 32 PG — SIGNIFICANT CHANGE UP (ref 27–34)
MCHC RBC-ENTMCNC: 33.8 GM/DL — SIGNIFICANT CHANGE UP (ref 32–36)
MCV RBC AUTO: 94.6 FL — SIGNIFICANT CHANGE UP (ref 80–100)
MONOCYTES # BLD AUTO: 0.83 K/UL — SIGNIFICANT CHANGE UP (ref 0–0.9)
MONOCYTES NFR BLD AUTO: 11.2 % — SIGNIFICANT CHANGE UP (ref 2–14)
NEUTROPHILS # BLD AUTO: 5.34 K/UL — SIGNIFICANT CHANGE UP (ref 1.8–7.4)
NEUTROPHILS NFR BLD AUTO: 72.4 % — SIGNIFICANT CHANGE UP (ref 43–77)
NITRITE UR-MCNC: NEGATIVE — SIGNIFICANT CHANGE UP
PH UR: 6 — SIGNIFICANT CHANGE UP (ref 5–8)
PLATELET # BLD AUTO: 215 K/UL — SIGNIFICANT CHANGE UP (ref 150–400)
POTASSIUM SERPL-MCNC: 3.4 MMOL/L — LOW (ref 3.5–5.3)
POTASSIUM SERPL-SCNC: 3.4 MMOL/L — LOW (ref 3.5–5.3)
PROT SERPL-MCNC: 5.8 GM/DL — LOW (ref 6–8.3)
PROT UR-MCNC: 15 MG/DL
RBC # BLD: 2.97 M/UL — LOW (ref 3.8–5.2)
RBC # FLD: 13 % — SIGNIFICANT CHANGE UP (ref 10.3–14.5)
SARS-COV-2 IGG+IGM SERPL QL IA: >250 U/ML — HIGH
SARS-COV-2 IGG+IGM SERPL QL IA: POSITIVE
SARS-COV-2 RNA SPEC QL NAA+PROBE: SIGNIFICANT CHANGE UP
SODIUM SERPL-SCNC: 134 MMOL/L — LOW (ref 135–145)
SP GR SPEC: 1.01 — SIGNIFICANT CHANGE UP (ref 1.01–1.02)
UROBILINOGEN FLD QL: NEGATIVE MG/DL — SIGNIFICANT CHANGE UP
WBC # BLD: 7.38 K/UL — SIGNIFICANT CHANGE UP (ref 3.8–10.5)
WBC # FLD AUTO: 7.38 K/UL — SIGNIFICANT CHANGE UP (ref 3.8–10.5)

## 2021-07-15 PROCEDURE — 73060 X-RAY EXAM OF HUMERUS: CPT | Mod: 26,LT,77

## 2021-07-15 PROCEDURE — 73060 X-RAY EXAM OF HUMERUS: CPT | Mod: 26,RT

## 2021-07-15 PROCEDURE — 99223 1ST HOSP IP/OBS HIGH 75: CPT

## 2021-07-15 PROCEDURE — 73090 X-RAY EXAM OF FOREARM: CPT | Mod: 26,LT,77

## 2021-07-15 PROCEDURE — 73090 X-RAY EXAM OF FOREARM: CPT | Mod: 26,LT

## 2021-07-15 PROCEDURE — 99232 SBSQ HOSP IP/OBS MODERATE 35: CPT

## 2021-07-15 RX ORDER — ONDANSETRON 8 MG/1
4 TABLET, FILM COATED ORAL EVERY 8 HOURS
Refills: 0 | Status: DISCONTINUED | OUTPATIENT
Start: 2021-07-15 | End: 2021-07-16

## 2021-07-15 RX ORDER — POTASSIUM CHLORIDE 20 MEQ
40 PACKET (EA) ORAL ONCE
Refills: 0 | Status: COMPLETED | OUTPATIENT
Start: 2021-07-15 | End: 2021-07-15

## 2021-07-15 RX ORDER — ACETAMINOPHEN 500 MG
650 TABLET ORAL EVERY 6 HOURS
Refills: 0 | Status: DISCONTINUED | OUTPATIENT
Start: 2021-07-15 | End: 2021-07-15

## 2021-07-15 RX ORDER — SODIUM CHLORIDE 9 MG/ML
1000 INJECTION INTRAMUSCULAR; INTRAVENOUS; SUBCUTANEOUS
Refills: 0 | Status: DISCONTINUED | OUTPATIENT
Start: 2021-07-15 | End: 2021-07-16

## 2021-07-15 RX ORDER — POTASSIUM CHLORIDE 20 MEQ
20 PACKET (EA) ORAL ONCE
Refills: 0 | Status: DISCONTINUED | OUTPATIENT
Start: 2021-07-15 | End: 2021-07-15

## 2021-07-15 RX ORDER — SENNA PLUS 8.6 MG/1
2 TABLET ORAL AT BEDTIME
Refills: 0 | Status: DISCONTINUED | OUTPATIENT
Start: 2021-07-15 | End: 2021-07-16

## 2021-07-15 RX ORDER — OXYCODONE HYDROCHLORIDE 5 MG/1
5 TABLET ORAL DAILY
Refills: 0 | Status: DISCONTINUED | OUTPATIENT
Start: 2021-07-15 | End: 2021-07-16

## 2021-07-15 RX ORDER — ACETAMINOPHEN 500 MG
650 TABLET ORAL EVERY 6 HOURS
Refills: 0 | Status: DISCONTINUED | OUTPATIENT
Start: 2021-07-15 | End: 2021-07-16

## 2021-07-15 RX ADMIN — SODIUM CHLORIDE 100 MILLILITER(S): 9 INJECTION INTRAMUSCULAR; INTRAVENOUS; SUBCUTANEOUS at 05:20

## 2021-07-15 RX ADMIN — Medication 650 MILLIGRAM(S): at 04:43

## 2021-07-15 RX ADMIN — Medication 40 MILLIEQUIVALENT(S): at 17:50

## 2021-07-15 RX ADMIN — Medication 1 TABLET(S): at 17:50

## 2021-07-15 NOTE — CONSULT NOTE ADULT - SUBJECTIVE AND OBJECTIVE BOX
HPI: 89y year old Female RHD s/p mechanical fall.  Landed on left arm.  Patient had pain in left shoulder and elbow.  Patient denies pain in any other joint, numbness, tingling, paresthesias. No pain in any other extremities.  No Head trauma or LOC. Lives at home alone. Does her own cooking and cleaning.     PMH:  Hiatal hernia  Arthritis      PSH:    [x ] No past surgical history    Allergies:  No Known Allergies      O:  T(C): 36.4 (07-14-21 @ 18:35), Max: 36.4 (07-14-21 @ 18:35)  HR: 75 (07-14-21 @ 18:35) (75 - 75)  BP: 107/84 (07-14-21 @ 18:35) (107/84 - 107/84)  RR: 18 (07-14-21 @ 18:35) (18 - 18)  SpO2: 100% (07-14-21 @ 18:35) (100% - 100%)    Gen: NAD  LUE: diffuse UE ecchymosis  superficial abrasion over olecranon  TTP about shoulder and elbow diffusely  No wrist pain  AIN/PIN/U Intact  SILT M/U/R  Radial pulse palpable 2+  Soft compartments    Secondary Survey: No TTP over bony prominences, SILT, palpable pulses, full/painless range of motion, compartments soft      Imaging: displaced left proximal humerus fracture. intraarticular left olecranon fracture with articular diastasis.     A/P 89F with left proximal humerus fracture and left olecranon fracture.     DW patient regarding her injuries, advised nonoperative treatment for left shoulder.   In regards to left olecranon, advised operative and nonoperative treatment both carry their associated pros and cons.   Operative treatment would give best functional outcome but comes with risks of hardware failure, infection, surgical risks of anesthesia.   Nonoperative treatment is acceptable in her age given her low demand and nondominant side. However, will have less predictable functional outcome in regards to healing and range of motion. However, it was explained that nonoperative treatment is a very acceptable option with the understand of the outcome including stiffness and loss of motion should her fracture heal. Patient agreeable and wishes to pursue nonoperative treatment.    Pain Control  Placed into posterior slab with sling.   Will plan to change to hanging arm cast in am to provide axial traction to the proximal humerus, as well as immobilize her elbow.   Will require follow up outpatient in 1 week with Dr. Morales. Will need to call and make arrangements for transport from rehab (or home if dispo to home after admission).   DW Dr. Morales - agreed with above. 
History of Present Illness:  Reason for Admission: fall, left proximal humerus and olecranon fx  History of Present Illness:   89 y/o female with a PMHx of arthritis of neck, back and knees b/l , hiatal hernia, rectal prolapse presents to the ED from home for fall down three steps. Pt states that she was walking up the stoop to get mail and the mail was too heavy for her to hold. She was unable to hold onto the banister for support and fell backwards down 3 concrete steps, falling primarily on her left side and left back. Pt c/o left shoulder, left elbow pain in ED. Pt denies feeling dizzy, lightheaded, chest pain, SOB at the time she fell. Pt lives alone and ambulates with cane. She was not using her cane to get the mail and states that she usually maintains her balance with the banister and door handle. Pt states that she has good appetite and drinks 5-6 cups water daily. Denies head injury, LOC, fever, chills, abdominal pain, n/v/c/d, dysuria, urinary retention, paresthesia in LE b/l.     Urology consulted for hydronephrosis and bladder prolapse. Pt voiding well. No SP pain or dysuria.    Review of Systems:  Review of Systems: ROS:   All 10 systems reviewed and found to be negative with the exception of what has been described above.      Allergies and Intolerances:        Allergies:  	No Known Allergies:     Home Medications:   * Patient Currently Takes Medications as of 15-Jul-2021 08:51 documented in Structured Notes  · 	Moderna COVID-19 Vaccine  mcg/0.5 mL intramuscular suspension: Last Dose Taken:  , 0.5 milliliter(s) intramuscular once  	***2nd dose April***    Patient History:   Past Medical, Past Surgical, and Family History:  PAST MEDICAL HISTORY:  Arthritis     Hiatal hernia     Rectal prolapse.     PAST SURGICAL HISTORY:  S/P hysterectomy .     FAMILY HISTORY:  Mother  Still living? Unknown  FHx: diabetes mellitus, Age at diagnosis: Age Unknown    Sibling  Still living? Unknown  FH: lung cancer, Age at diagnosis: Age Unknown.    Social History:  Social History (marital status, living situation, occupation, tobacco use, alcohol and drug use, and sexual history): Pt lives alone. States that she was living with her sister, however her sister  in April. Denies tobacco, alcohol and illicit drug use.    Vital Signs Last 24 Hrs  T(C): 37.2 (15 Jul 2021 21:03), Max: 37.2 (15 Jul 2021 21:03)  T(F): 99 (15 Jul 2021 21:03), Max: 99 (15 Jul 2021 21:03)  HR: 85 (15 Jul 2021 21:03) (74 - 88)  BP: 142/66 (15 Jul 2021 21:03) (130/63 - 153/59)  BP(mean): 75 (15 Jul 2021 01:48) (75 - 75)  RR: 18 (15 Jul 2021 21:03) (17 - 18)  SpO2: 99% (15 Jul 2021 21:) (96% - 100%)    NAD, A+Ox3  RRR  no increased WOB  no CVA tenderness  no SP pain or tenderness  ABD S NT ND                          9.5    7.38  )-----------( 215      ( 15 Jul 2021 11:30 )             28.1     07-15    134<L>  |  103  |  14  ----------------------------<  104<H>  3.4<L>   |  26  |  0.71    Ca    8.5      15 Jul 2021 11:30    TPro  5.8<L>  /  Alb  3.2<L>  /  TBili  0.7  /  DBili  x   /  AST  15  /  ALT  23  /  AlkPhos  69  07-15    < from: CT Abdomen and Pelvis No Cont (21 @ 19:48) >  KIDNEYS/URETERS: Severe left-sided hydronephrosis with transition at the UPJ and left renal atrophy. Small cyst in the right kidney.    BLADDER: There are there is bladder prolapse.    < end of copied text >

## 2021-07-15 NOTE — H&P ADULT - NSHPPHYSICALEXAM_GEN_ALL_CORE
GEN: lying in bed, NAD  HEENT:   NC/AT, pupils equal and reactive, EOMI  CV:  +S1, +S2, RRR  RESP:  lungs clear to auscultation bilaterally, no wheeze, rales, rhonchi   BREAST:  not examined  GI:  abdomen soft, non-tender, non-distended, normoactive BS  RECTAL:  not examined  :  not examined  MSK: + left long arm cast, ecchymosis of left proximal humerus and hand. sensation intact, capillary refill <2sec  EXT:  no edema  NEURO:  AAOX3, no focal neurological deficits  SKIN:  no rashes GEN: lying in bed, NAD  HEENT:   NC/AT, pupils equal and reactive, EOMI  CV:  +S1, +S2, RRR  RESP:  lungs clear to auscultation bilaterally, no wheeze, rales, rhonchi   BREAST:  not examined  GI:  abdomen soft, non-tender, non-distended, normoactive BS  RECTAL:  not examined  :  not examined  MSK: + left long arm cast, ecchymosis of left proximal humerus and hand. sensation intact, capillary refill <2sec  EXT:  no edema  NEURO:  AAOX3, no focal neurological deficits  SKIN:  5-6mm skin lesion on chin, no rashes No

## 2021-07-15 NOTE — H&P ADULT - NSICDXFAMILYHX_GEN_ALL_CORE_FT
FAMILY HISTORY:  Mother  Still living? Unknown  FHx: diabetes mellitus, Age at diagnosis: Age Unknown    Sibling  Still living? Unknown  FH: lung cancer, Age at diagnosis: Age Unknown

## 2021-07-15 NOTE — CONSULT NOTE ADULT - ASSESSMENT
89 yo F with LEFT hydronephrosis and bladder prolapse. 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 No pain or KAYCEE. Patient can have outpatient work up. No acute intervention required or recommended. Bladder prolapse is also chronic condition and can be evaluated by Uro Gyn as outpatient

## 2021-07-15 NOTE — H&P ADULT - NSHPLABSRESULTS_GEN_ALL_CORE
Vital Signs Last 24 Hrs  T(C): 35.9 (15 Jul 2021 07:24), Max: 36.9 (15 Jul 2021 01:48)  T(F): 96.6 (15 Jul 2021 07:24), Max: 98.4 (15 Jul 2021 01:48)  HR: 74 (15 Jul 2021 07:24) (74 - 78)  BP: 137/57 (15 Jul 2021 07:24) (107/84 - 153/59)  BP(mean): 75 (15 Jul 2021 01:48) (75 - 75)  RR: 18 (15 Jul 2021 07:24) (17 - 18)  SpO2: 100% (15 Jul 2021 07:24) (96% - 100%)                              9.5    7.38  )-----------( 215      ( 15 Jul 2021 11:30 )             28.1     07-15    134<L>  |  103  |  14  ----------------------------<  104<H>  3.4<L>   |  26  |  0.71    Ca    8.5      15 Jul 2021 11:30    TPro  5.8<L>  /  Alb  3.2<L>  /  TBili  0.7  /  DBili  x   /  AST  15  /  ALT  23  /  AlkPhos  69  07-15        LIVER FUNCTIONS - ( 15 Jul 2021 11:30 )  Alb: 3.2 g/dL / Pro: 5.8 gm/dL / ALK PHOS: 69 U/L / ALT: 23 U/L / AST: 15 U/L / GGT: x           PT/INR - ( 14 Jul 2021 19:18 )   PT: 12.3 sec;   INR: 1.06 ratio         PTT - ( 14 Jul 2021 19:18 )  PTT:36.2 sec      < from: Xray Humerus, Left (07.14.21 @ 20:05) >    IMPRESSION:   mottled appearance to the humeral head and humeral metaphysis suggesting an underlying pathologic process. There appears to be subacute transverse fracture through the proximal humeral diaphysis.    < end of copied text >    < from: Xray Elbow AP + Lateral, Left (07.14.21 @ 20:04) >    IMPRESSION:   Minimally displaced fracture of the olecranon is noted.    < end of copied text >    < from: Xray Shoulder 2 Views, Left (07.14.21 @ 20:04) >    IMPRESSION:   mottled appearance to the humeral head and humeral metaphysis suggesting an underlying pathologic process. There appears to be subacute transverse fracture through the proximal humeral diaphysis.    < end of copied text >    < from: Xray Chest 1 View- PORTABLE-Urgent (Xray Chest 1 View- PORTABLE-Urgent .) (11.07.18 @ 14:11) >    IMPRESSION:    No acute cardiopulmonary pathology.    Findings suggesting small hiatal hernia.    < end of copied text > Vital Signs Last 24 Hrs  T(C): 35.9 (15 Jul 2021 07:24), Max: 36.9 (15 Jul 2021 01:48)  T(F): 96.6 (15 Jul 2021 07:24), Max: 98.4 (15 Jul 2021 01:48)  HR: 74 (15 Jul 2021 07:24) (74 - 78)  BP: 137/57 (15 Jul 2021 07:24) (107/84 - 153/59)  BP(mean): 75 (15 Jul 2021 01:48) (75 - 75)  RR: 18 (15 Jul 2021 07:24) (17 - 18)  SpO2: 100% (15 Jul 2021 07:24) (96% - 100%)                          9.5    7.38  )-----------( 215      ( 15 Jul 2021 11:30 )             28.1     07-15    134<L>  |  103  |  14  ----------------------------<  104<H>  3.4<L>   |  26  |  0.71    Ca    8.5      15 Jul 2021 11:30    TPro  5.8<L>  /  Alb  3.2<L>  /  TBili  0.7  /  DBili  x   /  AST  15  /  ALT  23  /  AlkPhos  69  07-15        LIVER FUNCTIONS - ( 15 Jul 2021 11:30 )  Alb: 3.2 g/dL / Pro: 5.8 gm/dL / ALK PHOS: 69 U/L / ALT: 23 U/L / AST: 15 U/L / GGT: x           PT/INR - ( 14 Jul 2021 19:18 )   PT: 12.3 sec;   INR: 1.06 ratio         PTT - ( 14 Jul 2021 19:18 )  PTT:36.2 sec      < from: Xray Humerus, Left (07.14.21 @ 20:05) >    IMPRESSION:   mottled appearance to the humeral head and humeral metaphysis suggesting an underlying pathologic process. There appears to be subacute transverse fracture through the proximal humeral diaphysis.    < end of copied text >    < from: Xray Elbow AP + Lateral, Left (07.14.21 @ 20:04) >    IMPRESSION:   Minimally displaced fracture of the olecranon is noted.    < end of copied text >    < from: Xray Shoulder 2 Views, Left (07.14.21 @ 20:04) >    IMPRESSION:   mottled appearance to the humeral head and humeral metaphysis suggesting an underlying pathologic process. There appears to be subacute transverse fracture through the proximal humeral diaphysis.    < end of copied text >    < from: Xray Chest 1 View- PORTABLE-Urgent (Xray Chest 1 View- PORTABLE-Urgent .) (11.07.18 @ 14:11) >    IMPRESSION:    No acute cardiopulmonary pathology.    Findings suggesting small hiatal hernia.    < end of copied text > Vital Signs Last 24 Hrs  T(C): 35.9 (15 Jul 2021 07:24), Max: 36.9 (15 Jul 2021 01:48)  T(F): 96.6 (15 Jul 2021 07:24), Max: 98.4 (15 Jul 2021 01:48)  HR: 74 (15 Jul 2021 07:24) (74 - 78)  BP: 137/57 (15 Jul 2021 07:24) (107/84 - 153/59)  BP(mean): 75 (15 Jul 2021 01:48) (75 - 75)  RR: 18 (15 Jul 2021 07:24) (17 - 18)  SpO2: 100% (15 Jul 2021 07:24) (96% - 100%)                          9.5    7.38  )-----------( 215      ( 15 Jul 2021 11:30 )             28.1     07-15    134<L>  |  103  |  14  ----------------------------<  104<H>  3.4<L>   |  26  |  0.71    Ca    8.5      15 Jul 2021 11:30    TPro  5.8<L>  /  Alb  3.2<L>  /  TBili  0.7  /  DBili  x   /  AST  15  /  ALT  23  /  AlkPhos  69  07-15        LIVER FUNCTIONS - ( 15 Jul 2021 11:30 )  Alb: 3.2 g/dL / Pro: 5.8 gm/dL / ALK PHOS: 69 U/L / ALT: 23 U/L / AST: 15 U/L / GGT: x           PT/INR - ( 14 Jul 2021 19:18 )   PT: 12.3 sec;   INR: 1.06 ratio         PTT - ( 14 Jul 2021 19:18 )  PTT:36.2 sec    < from: CT Abdomen and Pelvis No Cont (07.14.21 @ 19:48) >      IMPRESSION:  Comminuted fracture of the left proximal humerus. No evidence for acute intrathoracic or abdominal injury.    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    Pelvic floor prolapse. Hiatal hernia. Additional findings as above..    < end of copied text >    < from: CT Head No Cont (07.14.21 @ 19:36) >    IMPRESSION:    1)  chronic ischemic changes noted in both hemispheres with volume loss and involutional change. No definitive acute abnormality or hemorrhage..  2)  no intracerebral hemorrhage, contusion, or extracerebral collections are identified.    < from: CT Cervical Spine No Cont (07.14.21 @ 19:36) >    IMPRESSION:    No acute fracture identified.    < end of copied text >      < from: Xray Humerus, Left (07.14.21 @ 20:05) >    IMPRESSION:   mottled appearance to the humeral head and humeral metaphysis suggesting an underlying pathologic process. There appears to be subacute transverse fracture through the proximal humeral diaphysis.    < end of copied text >    < from: Xray Elbow AP + Lateral, Left (07.14.21 @ 20:04) >    IMPRESSION:   Minimally displaced fracture of the olecranon is noted.    < end of copied text >    < from: Xray Shoulder 2 Views, Left (07.14.21 @ 20:04) >    IMPRESSION:   mottled appearance to the humeral head and humeral metaphysis suggesting an underlying pathologic process. There appears to be subacute transverse fracture through the proximal humeral diaphysis.    < end of copied text >

## 2021-07-15 NOTE — H&P ADULT - HISTORY OF PRESENT ILLNESS
89 y/o female with a PMHx of arthritis of neck, back and knees b/l , hiatal hernia, rectal prolapse presents to the ED from home for fall down three steps. Pt states that she was walking up the stoop to get mail and the mail was too heavy for her to hold. She was unable to hold onto the banister for support and fell backwards down 3 concrete steps, falling primarily on her left side and left back. Pt c/o left shoulder, left elbow pain in ED. Pt denies feeling dizzy, lightheaded, chest pain, SOB at the time she fell. Pt lives alone and ambulates with cane. She was not using her cane to get the mail and states that she usually maintains her balance with the banister and door handle. Pt states that she has good appetite and drinks 5-6 cups water daily. Denies head injury, LOC, fever, chills, abdominal pain, n/v/c/d, dysuria, urinary retention, paresthesia in LE b/l.

## 2021-07-15 NOTE — H&P ADULT - ATTENDING COMMENTS
Patient seen and examined with CARTER Allen.  I was physically present for the key portions of the evaluation and management (E/M) service provided.  I agree with the above history, physical, and plan which I have reviewed and edited where appropriate.   Left proximal humerus and olecranon fx - plan as per ortho  - left hydro - get urology eval. incidental finding  - pain meds prn  - PT eval   - likely rehab upon dc

## 2021-07-15 NOTE — ED ADULT NURSE REASSESSMENT NOTE - NS ED NURSE REASSESS COMMENT FT1
Pt with no urine for time in ED. bladder scan showed 400 cc urine in bladder. Pt states that she does not feel the urge to urinate at this time and refuses straight catheter. Pt states she does not usually have problems urinating on her own. Hardik PADRON made aware, IV fluids ordered.

## 2021-07-15 NOTE — H&P ADULT - ASSESSMENT
#Left proximal humerus and olecranon fx  - Ortho consulted, no surgical intervention as per pt wishes  - pt will f/u with Dr. Morales in 1 week as outpatient  - pain controlled with tylenol prn  - d/c IVF  - DASH diet    #Severe left hydronephrosis- Incidental finding    #rectal prolapse    #hiatal hernia    #DVT ppx   - ICDs #Left proximal humerus and olecranon fx  - Ortho consulted, no surgical intervention as per pt wishes  - pt will f/u with Dr. Morales in 1 week as outpatient  - pain controlled with tylenol prn  - DASH diet    #hypokalemia  - repleted  - recheck BMP in AM    #hyponatremia  - on NS   - restarted diet    #Severe left hydronephrosis- Incidental finding    #rectal prolapse    #hiatal hernia    #DVT ppx   - ICDs #Left proximal humerus and olecranon fx  - Ortho consulted, no surgical intervention as per pt wishes  - pt will f/u with Dr. Morales in 1 week as outpatient  - pain controlled with tylenol prn  - DASH diet  - a1c pending    #hypokalemia  - repleted  - recheck BMP in AM    #hyponatremia  - on IVF NS @100 for 10hrs  - restarted diet    #Normocytic Anemia  - suspected bleeding from acute fx and dilutional component  - monitor H&H    #Severe left hydronephrosis- CTAP Incidental finding  - nephrology consult    #rectal prolapse  #pelvic floor prolapse  #bladder prolapse  #hiatal hernia    #DVT ppx   - ICDs #Left proximal humerus and olecranon fx  - Ortho consulted, no surgical intervention as per pt wishes  - pt will f/u with Dr. Morales in 1 week as outpatient  - pain controlled with tylenol prn  - oxycodone 5mg prn   - DASH diet  - a1c pending    #hypokalemia  - repleted  - recheck BMP in AM    #hyponatremia  - on IVF NS @100 for 10hrs  - restarted diet    #Normocytic Anemia  - suspected bleeding from acute fx and dilutional component  - monitor H&H    #rectal prolapse  #pelvic floor prolapse  #bladder prolapse  #Severe left hydronephrosis- CTAP Incidental finding  - urology consulted    #hiatal hernia- incidental finding    #skin lesion   - had skin bx done by dermatologist Dr. Cornejo outpatient  - will f/u in 2 weeks    #DVT ppx   - ICDs #Left proximal humerus and olecranon fx  - Ortho consulted, no surgical intervention as per pt wishes  - pt will f/u with Dr. Morales in 1 week as outpatient  - pain controlled with tylenol prn  - oxycodone 5mg prn   - DASH diet  - a1c pending    #hypokalemia  - repleted  - recheck BMP in AM    #hyponatremia  - on IVF NS @100 for 10hrs  - restarted diet    #Normocytic Anemia  - suspected bleeding from acute fx and dilutional component  - monitor H&H    #rectal prolapse  #pelvic floor prolapse  #bladder prolapse  #Severe left hydronephrosis- CTAP Incidental finding  - urology consulted  - UA, Ucx pending    #hiatal hernia- incidental finding    #skin lesion   - had skin bx done by dermatologist Dr. Cornejo outpatient  - will f/u in 2 weeks    #DVT ppx   - ICDs

## 2021-07-15 NOTE — H&P ADULT - NSHPSOCIALHISTORY_GEN_ALL_CORE
Pt lives alone. States that she was living with her sister, however her sister  in April. Denies tobacco, alcohol and illicit drug use.

## 2021-07-16 ENCOUNTER — TRANSCRIPTION ENCOUNTER (OUTPATIENT)
Age: 86
End: 2021-07-16

## 2021-07-16 VITALS
OXYGEN SATURATION: 98 % | TEMPERATURE: 99 F | HEART RATE: 77 BPM | SYSTOLIC BLOOD PRESSURE: 131 MMHG | DIASTOLIC BLOOD PRESSURE: 52 MMHG | RESPIRATION RATE: 16 BRPM

## 2021-07-16 LAB
ALBUMIN SERPL ELPH-MCNC: 2.8 G/DL — LOW (ref 3.3–5)
ALP SERPL-CCNC: 58 U/L — SIGNIFICANT CHANGE UP (ref 40–120)
ALT FLD-CCNC: 22 U/L — SIGNIFICANT CHANGE UP (ref 12–78)
ANION GAP SERPL CALC-SCNC: 4 MMOL/L — LOW (ref 5–17)
AST SERPL-CCNC: 20 U/L — SIGNIFICANT CHANGE UP (ref 15–37)
BASOPHILS # BLD AUTO: 0.02 K/UL — SIGNIFICANT CHANGE UP (ref 0–0.2)
BASOPHILS NFR BLD AUTO: 0.4 % — SIGNIFICANT CHANGE UP (ref 0–2)
BILIRUB SERPL-MCNC: 0.6 MG/DL — SIGNIFICANT CHANGE UP (ref 0.2–1.2)
BUN SERPL-MCNC: 11 MG/DL — SIGNIFICANT CHANGE UP (ref 7–23)
CALCIUM SERPL-MCNC: 8.6 MG/DL — SIGNIFICANT CHANGE UP (ref 8.5–10.1)
CHLORIDE SERPL-SCNC: 106 MMOL/L — SIGNIFICANT CHANGE UP (ref 96–108)
CO2 SERPL-SCNC: 27 MMOL/L — SIGNIFICANT CHANGE UP (ref 22–31)
CREAT SERPL-MCNC: 0.63 MG/DL — SIGNIFICANT CHANGE UP (ref 0.5–1.3)
EOSINOPHIL # BLD AUTO: 0.03 K/UL — SIGNIFICANT CHANGE UP (ref 0–0.5)
EOSINOPHIL NFR BLD AUTO: 0.5 % — SIGNIFICANT CHANGE UP (ref 0–6)
GLUCOSE SERPL-MCNC: 98 MG/DL — SIGNIFICANT CHANGE UP (ref 70–99)
HCT VFR BLD CALC: 28.7 % — LOW (ref 34.5–45)
HGB BLD-MCNC: 9.4 G/DL — LOW (ref 11.5–15.5)
IMM GRANULOCYTES NFR BLD AUTO: 0.4 % — SIGNIFICANT CHANGE UP (ref 0–1.5)
LYMPHOCYTES # BLD AUTO: 0.99 K/UL — LOW (ref 1–3.3)
LYMPHOCYTES # BLD AUTO: 17.8 % — SIGNIFICANT CHANGE UP (ref 13–44)
MCHC RBC-ENTMCNC: 31.2 PG — SIGNIFICANT CHANGE UP (ref 27–34)
MCHC RBC-ENTMCNC: 32.8 GM/DL — SIGNIFICANT CHANGE UP (ref 32–36)
MCV RBC AUTO: 95.3 FL — SIGNIFICANT CHANGE UP (ref 80–100)
MONOCYTES # BLD AUTO: 0.61 K/UL — SIGNIFICANT CHANGE UP (ref 0–0.9)
MONOCYTES NFR BLD AUTO: 11 % — SIGNIFICANT CHANGE UP (ref 2–14)
NEUTROPHILS # BLD AUTO: 3.89 K/UL — SIGNIFICANT CHANGE UP (ref 1.8–7.4)
NEUTROPHILS NFR BLD AUTO: 69.9 % — SIGNIFICANT CHANGE UP (ref 43–77)
PLATELET # BLD AUTO: 194 K/UL — SIGNIFICANT CHANGE UP (ref 150–400)
POTASSIUM SERPL-MCNC: 3.9 MMOL/L — SIGNIFICANT CHANGE UP (ref 3.5–5.3)
POTASSIUM SERPL-SCNC: 3.9 MMOL/L — SIGNIFICANT CHANGE UP (ref 3.5–5.3)
PROT SERPL-MCNC: 5.6 GM/DL — LOW (ref 6–8.3)
RBC # BLD: 3.01 M/UL — LOW (ref 3.8–5.2)
RBC # FLD: 13.2 % — SIGNIFICANT CHANGE UP (ref 10.3–14.5)
SODIUM SERPL-SCNC: 137 MMOL/L — SIGNIFICANT CHANGE UP (ref 135–145)
WBC # BLD: 5.56 K/UL — SIGNIFICANT CHANGE UP (ref 3.8–10.5)
WBC # FLD AUTO: 5.56 K/UL — SIGNIFICANT CHANGE UP (ref 3.8–10.5)

## 2021-07-16 PROCEDURE — 99239 HOSP IP/OBS DSCHRG MGMT >30: CPT

## 2021-07-16 RX ORDER — CX-024414 0.2 MG/ML
0.5 INJECTION, SUSPENSION INTRAMUSCULAR
Qty: 0 | Refills: 0 | DISCHARGE

## 2021-07-16 RX ORDER — ACETAMINOPHEN 500 MG
2 TABLET ORAL
Qty: 0 | Refills: 0 | DISCHARGE
Start: 2021-07-16

## 2021-07-16 RX ORDER — OXYCODONE HYDROCHLORIDE 5 MG/1
1 TABLET ORAL
Qty: 0 | Refills: 0 | DISCHARGE
Start: 2021-07-16

## 2021-07-16 RX ORDER — SENNA PLUS 8.6 MG/1
2 TABLET ORAL
Qty: 0 | Refills: 0 | DISCHARGE
Start: 2021-07-16

## 2021-07-16 RX ADMIN — Medication 1 TABLET(S): at 09:16

## 2021-07-16 NOTE — DISCHARGE NOTE PROVIDER - CARE PROVIDER_API CALL
Sagar Chavez)  Urology  [address], [address] [place] [address]  Phone: [phone]  Fax: [phone]  Follow Up Time:     Adolfo Morales)  Orthopaedic Surgery  [address], [address]  [place] [address]  Phone: [phone]  Fax: [phone]  Follow Up Time:

## 2021-07-16 NOTE — PHYSICAL THERAPY INITIAL EVALUATION ADULT - GENERAL OBSERVATIONS, REHAB EVAL
The pt was received on 5S, supine, pleasant and cooperative and eager to get OOB and ambulate with PT. The pt had her left distal UE splinted and placed in a traction style sling

## 2021-07-16 NOTE — PHYSICAL THERAPY INITIAL EVALUATION ADULT - STANDING BALANCE: DYNAMIC, REHAB EVAL
Pt is at a high risk for falls due to her overall flexed/ kyphotic standing posture and her inability to use left UE for balance, as well as the reported heaviness of the left UE splint/sling./poor balance

## 2021-07-16 NOTE — PHYSICAL THERAPY INITIAL EVALUATION ADULT - ADDITIONAL COMMENTS
Pt lives alone and ambulates with cane. She was not using her cane to get the mail and states that she usually maintains her balance with the banister and door handle. Pt states that she has good appetite and drinks 5-6 cups water daily.  Does her own cooking and cleaning.

## 2021-07-16 NOTE — PHYSICAL THERAPY INITIAL EVALUATION ADULT - LIVES WITH, PROFILE
The pt reports that her sister who was 94 y.o., and who lived with her at her home, recently passed away in April on this patients birthday./alone

## 2021-07-16 NOTE — DISCHARGE NOTE PROVIDER - NSDCMRMEDTOKEN_GEN_ALL_CORE_FT
acetaminophen 325 mg oral tablet: 2 tab(s) orally every 6 hours, As needed, Temp greater or equal to 38.5C (101.3F), Mild Pain (1 - 3)  aluminum hydroxide-magnesium hydroxide 200 mg-200 mg/5 mL oral suspension: 30 milliliter(s) orally every 4 hours, As needed, Dyspepsia  Multiple Vitamins oral tablet: 2 tab(s) orally once a day  oxyCODONE 5 mg oral tablet: 1 tab(s) orally once a day, As needed, Severe Pain (7 - 10)  senna oral tablet: 2 tab(s) orally once a day (at bedtime), As needed, Constipation  Vitamin D3: 1 tab(s) orally once a day

## 2021-07-16 NOTE — PHYSICAL THERAPY INITIAL EVALUATION ADULT - PERTINENT HX OF CURRENT PROBLEM, REHAB EVAL
89 y/o female with a PMHx of arthritis of neck, back and knees b/l , hiatal hernia, rectal prolapse presents to the ED from home for fall down three steps. Pt states that she was walking up the stoop to get mail and the mail was too heavy for her to hold. She was unable to hold onto the banister for support and fell backwards down 3 concrete steps, falling primarily on her left side and left back. Pt c/o left shoulder, left elbow pain in ED.

## 2021-07-16 NOTE — DISCHARGE NOTE PROVIDER - NSDCCPCAREPLAN_GEN_ALL_CORE_FT
PRINCIPAL DISCHARGE DIAGNOSIS  Diagnosis: Humerus fracture  Assessment and Plan of Treatment: please follow with Ortho  in 1 week for management of left arm cast   follow up with urology as outpatient for bladder prolapse and left hydronephrosis which is chronic   follow up with your primary cdoctor and dermatologist as outpatient      SECONDARY DISCHARGE DIAGNOSES  Diagnosis: Olecranon fracture  Assessment and Plan of Treatment:     Diagnosis: Difficulty walking  Assessment and Plan of Treatment:

## 2021-07-16 NOTE — PHYSICAL THERAPY INITIAL EVALUATION ADULT - MODALITIES TREATMENT COMMENTS
The pt was returned to supine and adjusted for comfort in bed, bed alarm secured, RN aware and tending to the pt. The pt was in NAD at end of tx, Left UE secured on a pillow and with sling.

## 2021-07-16 NOTE — DISCHARGE NOTE PROVIDER - HOSPITAL COURSE
89 y/o female with a PMHx of arthritis of neck, back and knees b/l , hiatal hernia, rectal prolapse presents to the ED from home for fall down three steps. Pt states that she was walking up the stoop to get mail and the mail was too heavy for her to hold. She was unable to hold onto the banister for support and fell backwards down 3 concrete steps, falling primarily on her left side and left back. Pt c/o left shoulder, left elbow pain in ED. Pt denies feeling dizzy, lightheaded, chest pain, SOB at the time she fell. Pt lives alone and ambulates with cane. She was not using her cane to get the mail and states that she usually maintains her balance with the banister and door handle. Pt states that she has good appetite and drinks 5-6 cups water daily. Denies head injury, LOC, fever, chills, abdominal pain, n/v/c/d, dysuria, urinary retention, paresthesia in LE b/l.     7/16 pt reports she is waiting to work with physical therapy today, no new events , no new complaints , agrees to go to rehab   GEN: lying in bed, NAD  HEENT:   NC/AT, pupils equal and reactive, EOMI  CV:  +S1, +S2, RRR  RESP:  lungs clear to auscultation bilaterally, no wheeze, rales, rhonchi   BREAST:  not examined  GI:  abdomen soft, non-tender, non-distended, normoactive BS  RECTAL:  not examined  :  not examined  MSK: + left long arm cast, ecchymosis of left proximal humerus and hand. sensation intact, capillary refill <2sec  EXT:  no edema  NEURO:  AAOX3, no focal neurological deficits  SKIN:  5-6mm skin lesion on chin, no rashes      #Left proximal humerus and olecranon fx  - Ortho consulted, no surgical intervention as per pt wishes  - pt will f/u with Dr. 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 No pain or KAYCEE. Patient can have outpatient work up. No acute intervention required or recommended. Bladder prolapse is also chronic condition and can be evaluated by Uro Gyn as outpatient  - UA, Ucx pending    #hiatal hernia- incidental finding    #skin lesion   - had skin bx done by dermatologist Dr. Cornejo outpatient  - will f/u in 2 weeks    #DVT ppx      89 y/o female with a PMHx of arthritis of neck, back and knees b/l , hiatal hernia, rectal prolapse presents to the ED from home for fall down three steps. Pt states that she was walking up the stoop to get mail and the mail was too heavy for her to hold. She was unable to hold onto the banister for support and fell backwards down 3 concrete steps, falling primarily on her left side and left back. Pt c/o left shoulder, left elbow pain in ED. Pt denies feeling dizzy, lightheaded, chest pain, SOB at the time she fell. Pt lives alone and ambulates with cane. She was not using her cane to get the mail and states that she usually maintains her balance with the banister and door handle. Pt states that she has good appetite and drinks 5-6 cups water daily. Denies head injury, LOC, fever, chills, abdominal pain, n/v/c/d, dysuria, urinary retention, paresthesia in LE b/l.     7/16 pt reports she is waiting to work with physical therapy today, no new events , no new complaints , agrees to go to rehab   GEN: lying in bed, NAD  HEENT:   NC/AT, pupils equal and reactive, EOMI  CV:  +S1, +S2, RRR  RESP:  lungs clear to auscultation bilaterally, no wheeze, rales, rhonchi   BREAST:  not examined  GI:  abdomen soft, non-tender, non-distended, normoactive BS  RECTAL:  not examined  :  not examined  MSK: + left long arm cast, ecchymosis of left proximal humerus and hand. sensation intact, capillary refill <2sec  EXT:  no edema  NEURO:  AAOX3, no focal neurological deficits  SKIN:  5-6mm skin lesion on chin, no rashes      #Left proximal humerus and olecranon fx  - Ortho consulted, no surgical intervention as per pt wishes  - pt will f/u with Dr. 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 No pain or KAYCEE. Patient can have outpatient work up. No acute intervention required or recommended. Bladder prolapse is also chronic condition and can be evaluated by Uro Gyn as outpatient      #hiatal hernia- incidental finding    #skin lesion   - had skin bx done by dermatologist Dr. Cornejo outpatient  - will f/u in 2 weeks    discharge time 47mins   I left voice  message for bucky Bryant

## 2021-07-16 NOTE — DISCHARGE NOTE NURSING/CASE MANAGEMENT/SOCIAL WORK - PATIENT PORTAL LINK FT
You can access the FollowMyHealth Patient Portal offered by [facility] by registering at the following website: [email or web address]. By joining [employer]’s FollowMyHealth portal, you will also be able to view your health information using other applications (apps) compatible with our system.

## 2021-07-18 LAB
CULTURE RESULTS: SIGNIFICANT CHANGE UP
SPECIMEN SOURCE: SIGNIFICANT CHANGE UP

## 2021-07-28 DIAGNOSIS — N13.0 HYDRONEPHROSIS WITH URETEROPELVIC JUNCTION OBSTRUCTION: ICD-10-CM

## 2021-07-28 DIAGNOSIS — S52.022A DISPLACED FRACTURE OF OLECRANON PROCESS WITHOUT INTRAARTICULAR EXTENSION OF LEFT ULNA, INITIAL ENCOUNTER FOR CLOSED FRACTURE: ICD-10-CM
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
## 2021-07-28 DIAGNOSIS — S42.302A UNSPECIFIED FRACTURE OF SHAFT OF HUMERUS, LEFT ARM, INITIAL ENCOUNTER FOR CLOSED FRACTURE: ICD-10-CM

## 2021-07-28 DIAGNOSIS — Z90.710 ACQUIRED ABSENCE OF BOTH CERVIX AND UTERUS: ICD-10-CM
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
## 2021-07-28 DIAGNOSIS — N81.89 OTHER FEMALE GENITAL PROLAPSE: ICD-10-CM

## 2021-07-28 DIAGNOSIS — D64.9 ANEMIA, UNSPECIFIED: ICD-10-CM

## 2021-07-28 DIAGNOSIS — N81.10 CYSTOCELE, UNSPECIFIED: ICD-10-CM

## 2021-07-28 DIAGNOSIS — E87.6 HYPOKALEMIA: ICD-10-CM

## 2021-07-28 DIAGNOSIS — K44.9 DIAPHRAGMATIC HERNIA WITHOUT OBSTRUCTION OR GANGRENE: ICD-10-CM

## 2021-07-28 DIAGNOSIS — M19.90 UNSPECIFIED OSTEOARTHRITIS, UNSPECIFIED SITE: ICD-10-CM

## 2021-07-28 DIAGNOSIS — E87.1 HYPO-OSMOLALITY AND HYPONATREMIA: ICD-10-CM

## 2021-07-28 DIAGNOSIS — R26.2 DIFFICULTY IN WALKING, NOT ELSEWHERE CLASSIFIED: ICD-10-CM

## 2022-01-19 NOTE — ED STATDOCS - NSTIMEPROVIDERCAREINITIATE_GEN_ER
Occupational Therapy Treatment Note   Date: 1/19/2022  Name: Herrera Rosario  [facility] Number: [id number]  Age: 4 y.o. 3 m.o.    Therapy Diagnosis:   Encounter Diagnoses   Name Primary?    Fine motor delay Yes    Developmental delay      Physician: Juan Carlos Diaz     Physician Orders: Continuation of Therapy  Medical Diagnosis: R62.50 (ICD-10-CM) - Developmental delay Procedures:  Evaluation Date: 3/12/2019  Insurance Authorization Period Expiration: 1/3/2023  Plan of Care Certification Period: 11/16/2021  to 05/16/2022    Visit # / Visits authorized: 1 / 1  Time In: 8:02 am   Time Out: 8:40 am  Total Billable Time: 38 minutes    Precautions:  Standard  Subjective   Mother brought Herrera to therapy today.  Pt / caregiver reports: no new information.    Response to previous treatment:able to identify correct shape sorter holes.    Pain: Child too young to understand and rate pain levels. No pain behaviors or report of pain.   Objective     Herrera participated in dynamic functional therapeutic activities to improve functional performance for 38 minutes, including:  - transitioned into session from  with good ability   - strung 5 large beads onto  with minimum tactile cueing for appropriate hand placement; progressed to 3 beads on standard string with moderate assistance  - completed large 5 piece shape puzzle for increased visual motor skills; placed 2/5 shapes into appropriate shapes  - completed shape sorter for [place], square, triangle, star, and cross pieces; able to identify correct hole but required minimum assistance for orienting shapes into container   - scribbled while alternating between digital pronate grasp and tripod grasp on makers; required tactile cues for tripod grasp and maintained grasp for 25% of activity; independently replicated vertical lines x 3 trials following therapist demonstration   - scooped poms poms out of  bowel into target with small shovel requiring hand over  hand assistance for 5/5 trials      Formal Testing:   The PDMS 2nd Edition      Home Exercises and Education Provided     Education provided:   - Caregiver educated on current performance and POC. Caregiver verbalized understanding.      Written Home Exercises Provided:none at this session.       Assessment     Pt would continue to benefit from skilled OT. He displayed fair engagement in session with moderate throwing throughout session this date. He required decreased assistance for identifying correct shape sorter shapes and showed increased ability to maintain tripod grasp, replicating 3 vertical lines. Herrera is progressing well towards his goals and there are no updates to goals at this time.     Pt will continue to benefit from skilled outpatient occupational therapy to address the deficits listed in the problem list on initial evaluation provide pt/family education and to maximize pt's level of independence in the home and community environment.     Pt prognosis is Fair.  Anticipated barriers to occupational therapy: attention, participation, attendance , language and motivation  Pt's spiritual, cultural and educational needs considered and pt agreeable to plan of care and goals.    Goals:  Short term goals: (02/16/2022)  1. Patient will demonstrate ability to place 2/3 shapes into puzzle with mod visual cueing for increased visual motor integration skills. (PROGRESSING)  2. Patient will demonstrate ability to copy a vertical line in 1/3 attempts with maximum visual and verbal cueing for increased visual motor integration skills. (PROGRESSING)  3. Patient will demonstrate ability to scoop puree foods with spoon and bring to mouth independently 3/5 trials for increased feeding skills. (PROGRESSING)  4.Patient will demonstrate ability to independently string 4/5 large beads onto  for increased bimanual skills. (PROGRESSING)        Long term goals: (5/16/2022)  1. Patient will demonstrate ability to  place 2/3 shapes into puzzle with min visual cueing for increased visual motor integration skills. (PROGRESSING)  2.  Patient will demonstrate ability to copy a vertical line in 3/3 attempts with maximum visual and verbal cueing for increased visual motor integration skills. (PROGRESSING)  3. Patient will demonstrate ability to string 5 large beads onto standard string with minimum assistance for increased bimanual skills. (PROGRESSING)  4. Family to implement HEP for mealtime guide and age appropriate feeding skills.(CONTINUE)    Plan   Continue plan of care.    Occupational therapy services will be provided 1/week through direct intervention, parent education and home programming. Therapy will be discontinued when child has met all goals, is not making progress, parent discontinues therapy, and/or for any other applicable reasons    LEE Christensen   1/19/2022       07-Nov-2018 11:56

## 2023-01-04 NOTE — H&P ADULT - NSHPPOAPULMEMBOLUS_GEN_A_CORE
Provided boxed lunch and water  Encouraged to give urine sample  Urinal at bedside        Julio Cesar Woods RN  01/04/23 1830
no

## 2023-02-15 PROBLEM — K62.3 RECTAL PROLAPSE: Chronic | Status: ACTIVE | Noted: 2021-07-15

## 2023-07-25 ENCOUNTER — APPOINTMENT (OUTPATIENT)
Dept: INTERNAL MEDICINE | Facility: CLINIC | Age: 88
End: 2023-07-25

## 2023-08-25 NOTE — ED ADULT NURSE NOTE - CAS ELECT INFOMATION PROVIDED
Chief Complaint   Patient presents with   • Covid Infection     Had a positive COVID test and was seen in Urgent Care last Monday.  Last week had body aches, chills, fever, cough, headache.  Now this week x2 days, vomited x2 in 24 hours,  nausea, headaches.  Yesterday a little better, today, more lightheaded, blood pressure fluctuating at home.  Drinking more fluids today.  Denies bleeding in vomit or stool. Chills at home.  No acetaminophen or ibuprofen today.    Maybe felt better x2 days before started insulin.     • Md Courtney Upton insulin started x2 days ago, for high blood sugars, in 300s.  Nauseated before this but did have vomiting on this date.  Per patient was 300-400 now 100-200s.          HISTORY OF PRESENT ILLNESS:   The patient is a 56 year old male who presents with a chief complaint of ongoing, intermittent lightheadedness.  Patient was initially seen at all since emergency room on 08/14/2023 with a cough and URI symptoms.  He was diagnosed with COVID-19 at that visit.  He then was seen again in the emergency department on 08/18/2023 for symptoms of lightheadedness.  He called his primary doctor's office on 08/21 with a concern for dizziness.  He was seen again in the ER on 08/21 4 lightheadedness and had an extensive workup done at that time.  He has recently had his diabetic regime changed and is now taking insulin.  Patient was seen again on 08/23/2023 in the emergency department for ongoing problems with lightheadedness.  Patient was given a prescription for Zofran to help with nausea.  He states he took the medication and has no longer had any problems with the nausea.  He describes his lightheadedness as an annoying sensation.  He denies headache.  He denies vertiginous symptoms.  He denies feeling like he is going to fall down.  His blood sugars have been ranging in the 300-400 range before he started insulin and now his blood sugars are running more in the 100-250 range.  He did have an  episode at work where his blood pressure was low when he was feeling lightheaded.  His blood pressure is stable at the present time.  He states that he has a massage chair at home as well as a hand-held massager and when he puts the hand-held massager on his head, his symptoms improve.  He states his wife is concerned that this massager might be doing some damage to his head and she does not feel it is good for him to do it.  Patient states he did not go to work today and will need a work note.  He has an appointment to follow-up with Dr. Tate next week.    PAST MEDICAL HISTORY, PAST SURGICAL HISTORY, SOCIAL HISTORY, MEDICATIONS, AND ALLERGIES:  Reviewed per electronic chart.    REVIEW OF SYSTEMS:  Constitutional:  No fever or chills   Eyes:  No change in visual acuity   Respiratory:  Denies cough or SOB  Cardiovascular:  Denies chest pain or edema   Gastrointestinal:  As per HPI  Genitourinary:  Denies dysuria   Musculoskeletal:  Denies extremity pain  Neurological:  No current headache  Integument:  Denies rash       PHYSICAL EXAM:  Vitals:   Vitals:    08/25/23 1543   BP: 124/81   BP Location: RUE - Right upper extremity   Patient Position: Sitting   Pulse: 80   Resp: 16   Temp: 96.3 °F (35.7 °C)   TempSrc: Tympanic   SpO2: 99%   Weight: 111.1 kg (245 lb)   Height: 6' 1\" (1.854 m)   PainSc:  0      SpO2 Readings from Last 1 Encounters:   08/25/23 99%     Constitutional:  No acute distress, nontoxic appearance.  HENT: Normocephalic,  Oropharynx moist. No oral exudates.   Eyes:   Conjunctivae normal. No discharge. PERRL, EOM's intact.  Neck:  Normal range of motion. No tenderness. Supple.  Cardiovascular:  Normal heart rate. Normal rhythm.   Pulmonary/Chest:  Normal breath sounds. No respiratory distress. No wheezing. No chest tenderness   Musculoskeletal: Ambulates well, no extremity pain   Neurological: Pt. Ambulates with an upright steady gait. No loss of balance with Romberg exam. No past pointing with finger  to nose exam. No dysdiadochokinesia. Hand grasps strong and equal bilaterally. No pronator drift noted. Speech and mentation clear.  No numbness or tingling to the extremities. No loss of bowel or bladder control. Able to move all extremities well     Orders Placed This Encounter   • POCT Blood Sugar Hand Held Device   • ondansetron (ZOFRAN ODT) 4 MG disintegrating tablet   • dulaglutide (TRULICITY) 0.75 MG/0.5ML pen-injector       ASSESSMENT:   Encounter Diagnoses   Name Primary?   • Dizziness Yes       PLAN:  Discussed exam findings and treatment plan with the patient.  I do not feel he has any worrisome neurologic symptoms at this time.  He is encouraged to continue his current regime regarding his diabetes and his insulin.  He is encouraged to sit down if he is feeling somewhat lightheaded.  He has been monitoring his blood pressure at home and he is encouraged to continue doing this.  He should follow-up with Dr. Tate's office if he is having ongoing symptoms.  He states he was told that they might try to contact him and get him in sooner than 08/31/2023 for his next appointment.    Pt. Is told to f/u with their primary provider if symptoms are not improving in the next few days. Pt. Is told to go the Emergency Department if symptoms suddenly worsen. Pt. Verbalized understanding of the instructions and is stable for discharge.      DC instructions

## 2024-01-27 ENCOUNTER — INPATIENT (INPATIENT)
Facility: HOSPITAL | Age: 89
LOS: 3 days | Discharge: HOME HEALTH | DRG: 689 | End: 2024-01-31
Attending: FAMILY MEDICINE | Admitting: STUDENT IN AN ORGANIZED HEALTH CARE EDUCATION/TRAINING PROGRAM
Payer: MEDICARE

## 2024-01-27 VITALS
WEIGHT: 138.01 LBS | DIASTOLIC BLOOD PRESSURE: 68 MMHG | HEART RATE: 77 BPM | TEMPERATURE: 98 F | OXYGEN SATURATION: 97 % | RESPIRATION RATE: 17 BRPM | HEIGHT: 66 IN | SYSTOLIC BLOOD PRESSURE: 151 MMHG

## 2024-01-27 DIAGNOSIS — N39.0 URINARY TRACT INFECTION, SITE NOT SPECIFIED: ICD-10-CM

## 2024-01-27 DIAGNOSIS — Z90.710 ACQUIRED ABSENCE OF BOTH CERVIX AND UTERUS: Chronic | ICD-10-CM

## 2024-01-27 LAB
ALBUMIN SERPL ELPH-MCNC: 3.1 G/DL — LOW (ref 3.3–5)
ALP SERPL-CCNC: 77 U/L — SIGNIFICANT CHANGE UP (ref 40–120)
ALT FLD-CCNC: 24 U/L — SIGNIFICANT CHANGE UP (ref 12–78)
ANION GAP SERPL CALC-SCNC: 6 MMOL/L — SIGNIFICANT CHANGE UP (ref 5–17)
APPEARANCE UR: ABNORMAL
AST SERPL-CCNC: 15 U/L — SIGNIFICANT CHANGE UP (ref 15–37)
BASOPHILS # BLD AUTO: 0.01 K/UL — SIGNIFICANT CHANGE UP (ref 0–0.2)
BASOPHILS NFR BLD AUTO: 0.2 % — SIGNIFICANT CHANGE UP (ref 0–2)
BILIRUB SERPL-MCNC: 0.5 MG/DL — SIGNIFICANT CHANGE UP (ref 0.2–1.2)
BILIRUB UR-MCNC: NEGATIVE — SIGNIFICANT CHANGE UP
BUN SERPL-MCNC: 15 MG/DL — SIGNIFICANT CHANGE UP (ref 7–23)
CALCIUM SERPL-MCNC: 9.2 MG/DL — SIGNIFICANT CHANGE UP (ref 8.5–10.1)
CHLORIDE SERPL-SCNC: 93 MMOL/L — LOW (ref 96–108)
CO2 SERPL-SCNC: 24 MMOL/L — SIGNIFICANT CHANGE UP (ref 22–31)
COLOR SPEC: YELLOW — SIGNIFICANT CHANGE UP
CREAT SERPL-MCNC: 1.01 MG/DL — SIGNIFICANT CHANGE UP (ref 0.5–1.3)
DIFF PNL FLD: ABNORMAL
EGFR: 52 ML/MIN/1.73M2 — LOW
EOSINOPHIL # BLD AUTO: 0.01 K/UL — SIGNIFICANT CHANGE UP (ref 0–0.5)
EOSINOPHIL NFR BLD AUTO: 0.2 % — SIGNIFICANT CHANGE UP (ref 0–6)
GLUCOSE SERPL-MCNC: 127 MG/DL — HIGH (ref 70–99)
GLUCOSE UR QL: NEGATIVE MG/DL — SIGNIFICANT CHANGE UP
HCT VFR BLD CALC: 32.8 % — LOW (ref 34.5–45)
HGB BLD-MCNC: 11.5 G/DL — SIGNIFICANT CHANGE UP (ref 11.5–15.5)
IMM GRANULOCYTES NFR BLD AUTO: 0.4 % — SIGNIFICANT CHANGE UP (ref 0–0.9)
KETONES UR-MCNC: ABNORMAL MG/DL
LEUKOCYTE ESTERASE UR-ACNC: ABNORMAL
LYMPHOCYTES # BLD AUTO: 0.96 K/UL — LOW (ref 1–3.3)
LYMPHOCYTES # BLD AUTO: 16.8 % — SIGNIFICANT CHANGE UP (ref 13–44)
MAGNESIUM SERPL-MCNC: 2 MG/DL — SIGNIFICANT CHANGE UP (ref 1.6–2.6)
MCHC RBC-ENTMCNC: 31.5 PG — SIGNIFICANT CHANGE UP (ref 27–34)
MCHC RBC-ENTMCNC: 35.1 GM/DL — SIGNIFICANT CHANGE UP (ref 32–36)
MCV RBC AUTO: 89.9 FL — SIGNIFICANT CHANGE UP (ref 80–100)
MONOCYTES # BLD AUTO: 0.72 K/UL — SIGNIFICANT CHANGE UP (ref 0–0.9)
MONOCYTES NFR BLD AUTO: 12.6 % — SIGNIFICANT CHANGE UP (ref 2–14)
NEUTROPHILS # BLD AUTO: 3.98 K/UL — SIGNIFICANT CHANGE UP (ref 1.8–7.4)
NEUTROPHILS NFR BLD AUTO: 69.8 % — SIGNIFICANT CHANGE UP (ref 43–77)
NITRITE UR-MCNC: NEGATIVE — SIGNIFICANT CHANGE UP
OSMOLALITY UR: 367 MOSM/KG — SIGNIFICANT CHANGE UP (ref 50–1200)
PH UR: 6.5 — SIGNIFICANT CHANGE UP (ref 5–8)
PLATELET # BLD AUTO: 241 K/UL — SIGNIFICANT CHANGE UP (ref 150–400)
POTASSIUM SERPL-MCNC: 4 MMOL/L — SIGNIFICANT CHANGE UP (ref 3.5–5.3)
POTASSIUM SERPL-SCNC: 4 MMOL/L — SIGNIFICANT CHANGE UP (ref 3.5–5.3)
PROT SERPL-MCNC: 6.9 GM/DL — SIGNIFICANT CHANGE UP (ref 6–8.3)
PROT UR-MCNC: 300 MG/DL
RBC # BLD: 3.65 M/UL — LOW (ref 3.8–5.2)
RBC # FLD: 13.6 % — SIGNIFICANT CHANGE UP (ref 10.3–14.5)
SODIUM SERPL-SCNC: 123 MMOL/L — LOW (ref 135–145)
SP GR SPEC: 1.03 — SIGNIFICANT CHANGE UP (ref 1–1.03)
TROPONIN I, HIGH SENSITIVITY RESULT: 4.56 NG/L — SIGNIFICANT CHANGE UP
UROBILINOGEN FLD QL: 0.2 MG/DL — SIGNIFICANT CHANGE UP (ref 0.2–1)
WBC # BLD: 5.7 K/UL — SIGNIFICANT CHANGE UP (ref 3.8–10.5)
WBC # FLD AUTO: 5.7 K/UL — SIGNIFICANT CHANGE UP (ref 3.8–10.5)

## 2024-01-27 PROCEDURE — 99285 EMERGENCY DEPT VISIT HI MDM: CPT

## 2024-01-27 PROCEDURE — 71045 X-RAY EXAM CHEST 1 VIEW: CPT | Mod: 26

## 2024-01-27 PROCEDURE — 36415 COLL VENOUS BLD VENIPUNCTURE: CPT

## 2024-01-27 PROCEDURE — 97162 PT EVAL MOD COMPLEX 30 MIN: CPT | Mod: GP

## 2024-01-27 PROCEDURE — 85025 COMPLETE CBC W/AUTO DIFF WBC: CPT

## 2024-01-27 PROCEDURE — 97110 THERAPEUTIC EXERCISES: CPT | Mod: GP

## 2024-01-27 PROCEDURE — 74177 CT ABD & PELVIS W/CONTRAST: CPT | Mod: 26,MA

## 2024-01-27 PROCEDURE — 80048 BASIC METABOLIC PNL TOTAL CA: CPT

## 2024-01-27 PROCEDURE — 99223 1ST HOSP IP/OBS HIGH 75: CPT | Mod: GC

## 2024-01-27 PROCEDURE — 93010 ELECTROCARDIOGRAM REPORT: CPT

## 2024-01-27 PROCEDURE — 83930 ASSAY OF BLOOD OSMOLALITY: CPT

## 2024-01-27 PROCEDURE — 97116 GAIT TRAINING THERAPY: CPT | Mod: GP

## 2024-01-27 PROCEDURE — 97530 THERAPEUTIC ACTIVITIES: CPT | Mod: GP

## 2024-01-27 RX ORDER — SODIUM CHLORIDE 9 MG/ML
1000 INJECTION INTRAMUSCULAR; INTRAVENOUS; SUBCUTANEOUS ONCE
Refills: 0 | Status: COMPLETED | OUTPATIENT
Start: 2024-01-27 | End: 2024-01-27

## 2024-01-27 RX ORDER — LANOLIN ALCOHOL/MO/W.PET/CERES
3 CREAM (GRAM) TOPICAL AT BEDTIME
Refills: 0 | Status: DISCONTINUED | OUTPATIENT
Start: 2024-01-27 | End: 2024-01-31

## 2024-01-27 RX ORDER — ACETAMINOPHEN 500 MG
650 TABLET ORAL EVERY 6 HOURS
Refills: 0 | Status: DISCONTINUED | OUTPATIENT
Start: 2024-01-27 | End: 2024-01-31

## 2024-01-27 RX ORDER — CHOLECALCIFEROL (VITAMIN D3) 125 MCG
1 CAPSULE ORAL
Qty: 0 | Refills: 0 | DISCHARGE

## 2024-01-27 RX ORDER — CEFTRIAXONE 500 MG/1
1000 INJECTION, POWDER, FOR SOLUTION INTRAMUSCULAR; INTRAVENOUS ONCE
Refills: 0 | Status: DISCONTINUED | OUTPATIENT
Start: 2024-01-27 | End: 2024-01-27

## 2024-01-27 RX ORDER — CEFTRIAXONE 500 MG/1
1000 INJECTION, POWDER, FOR SOLUTION INTRAMUSCULAR; INTRAVENOUS ONCE
Refills: 0 | Status: COMPLETED | OUTPATIENT
Start: 2024-01-27 | End: 2024-01-27

## 2024-01-27 RX ORDER — HEPARIN SODIUM 5000 [USP'U]/ML
5000 INJECTION INTRAVENOUS; SUBCUTANEOUS EVERY 12 HOURS
Refills: 0 | Status: DISCONTINUED | OUTPATIENT
Start: 2024-01-27 | End: 2024-01-31

## 2024-01-27 RX ORDER — ONDANSETRON 8 MG/1
4 TABLET, FILM COATED ORAL ONCE
Refills: 0 | Status: COMPLETED | OUTPATIENT
Start: 2024-01-27 | End: 2024-01-27

## 2024-01-27 RX ORDER — CEFTRIAXONE 500 MG/1
1000 INJECTION, POWDER, FOR SOLUTION INTRAMUSCULAR; INTRAVENOUS EVERY 24 HOURS
Refills: 0 | Status: DISCONTINUED | OUTPATIENT
Start: 2024-01-27 | End: 2024-01-27

## 2024-01-27 RX ORDER — PANTOPRAZOLE SODIUM 20 MG/1
40 TABLET, DELAYED RELEASE ORAL
Refills: 0 | Status: DISCONTINUED | OUTPATIENT
Start: 2024-01-27 | End: 2024-01-27

## 2024-01-27 RX ORDER — CEFTRIAXONE 500 MG/1
1000 INJECTION, POWDER, FOR SOLUTION INTRAMUSCULAR; INTRAVENOUS EVERY 24 HOURS
Refills: 0 | Status: DISCONTINUED | OUTPATIENT
Start: 2024-01-28 | End: 2024-01-31

## 2024-01-27 RX ORDER — ONDANSETRON 8 MG/1
4 TABLET, FILM COATED ORAL EVERY 8 HOURS
Refills: 0 | Status: DISCONTINUED | OUTPATIENT
Start: 2024-01-27 | End: 2024-01-31

## 2024-01-27 RX ADMIN — HEPARIN SODIUM 5000 UNIT(S): 5000 INJECTION INTRAVENOUS; SUBCUTANEOUS at 23:40

## 2024-01-27 RX ADMIN — SODIUM CHLORIDE 1000 MILLILITER(S): 9 INJECTION INTRAMUSCULAR; INTRAVENOUS; SUBCUTANEOUS at 23:39

## 2024-01-27 RX ADMIN — CEFTRIAXONE 1000 MILLIGRAM(S): 500 INJECTION, POWDER, FOR SOLUTION INTRAMUSCULAR; INTRAVENOUS at 17:35

## 2024-01-27 RX ADMIN — ONDANSETRON 4 MILLIGRAM(S): 8 TABLET, FILM COATED ORAL at 15:31

## 2024-01-27 NOTE — ED PROVIDER NOTE - ADMIT DISPOSITION PRESENT ON ADMISSION SEPSIS
Cast or Splint Care, Pediatric  Motrin every 6 hours as needed  Casts and splints are supports that are worn to protect broken bones and other injuries. A cast or splint may hold a bone still and in the correct position while it heals. Casts and splints may also help ease pain, swelling, and muscle spasms.    A cast is a hardened support that is usually made of fiberglass or plaster. It is custom-fit to the body and it offers more protection than a splint. It cannot be taken off and put back on. A splint is a type of soft support that is usually made from cloth and elastic. It can be adjusted or taken off as needed.    Your child may need a cast or a splint if he or she:    Has a broken bone.  Has a soft-tissue injury.  Needs to keep an injured body part from moving (keep it immobile) after surgery.    How to care for your child's cast  Do not allow your child to stick anything inside the cast to scratch the skin. Sticking something in the cast increases your child's risk of infection.  Check the skin around the cast every day. Tell your child's health care provider about any concerns.  You may put lotion on dry skin around the edges of the cast. Do not put lotion on the skin underneath the cast.  Keep the cast clean.  ImageIf the cast is not waterproof:    Do not let it get wet.  Cover it with a watertight covering when your child takes a bath or a shower.    How to care for your child's splint  Have your child wear it as told by your child's health care provider. Remove it only as told by your child's health care provider.  Loosen the splint if your child's fingers or toes tingle, become numb, or turn cold and blue.  Keep the splint clean.  ImageIf the splint is not waterproof:    Do not let it get wet.  Cover it with a watertight covering when your child takes a bath or a shower.    Follow these instructions at home:  Bathing     Do not have your child take baths or swim until his or her health care provider approves. Ask your child's health care provider if your child can take showers. Your child may only be allowed to take sponge baths for bathing.  If your child's cast or splint is not waterproof, cover it with a watertight covering when he or she takes a bath or shower.  Managing pain, stiffness, and swelling     Have your child move his or her fingers or toes often to avoid stiffness and to lessen swelling.  Have your child raise (elevate) the injured area above the level of his or her heart while he or she is sitting or lying down.  Safety     Do not allow your child to use the injured limb to support his or her body weight until your child's health care provider says that it is okay.  Have your child use crutches or other assistive devices as told by your child's health care provider.  General instructions     Do not allow your child to put pressure on any part of the cast or splint until it is fully hardened. This may take several hours.  Have your child return to his or her normal activities as told by his or her health care provider. Ask your child's health care provider what activities are safe for your child.  Give over-the-counter and prescription medicines only as told by your child's health care provider.  Keep all follow-up visits as told by your child’s health care provider. This is important.  Contact a health care provider if:  Your child’s cast or splint gets damaged.  Your child's skin under or around the cast becomes red or raw.  Your child’s skin under the cast is extremely itchy or painful.  Your child's cast or splint feels very uncomfortable.  Your child’s cast or splint is too tight or too loose.  Your child’s cast becomes wet or it develops a soft spot or area.  Your child gets an object stuck under the cast.  Get help right away if:  Your child's pain is getting worse.  Your child’s injured area tingles, becomes numb, or turns cold and blue.  The part of your child's body above or below the cast is swollen or discolored.  Your child cannot feel or move his or her fingers or toes.  There is fluid leaking through the cast.  Your child has severe pain or pressure under the cast.  This information is not intended to replace advice given to you by your health care provider. Make sure you discuss any questions you have with your health care provider.
No

## 2024-01-27 NOTE — ED PROVIDER NOTE - PHYSICAL EXAMINATION
GENERAL: Awake. Alert. NAD. Well nourished.  HEENT: NC/AT, PERRL, EOMI, Conjunctiva pink, no scleral icterus. Airway patent. Moist mucous membranes.  LUNGS: CTAB. No wheezes or rales noted.  CARDIAC: RRR.  ABDOMEN: No masses noted. Soft, mild generalized ttp, ND, no rebound, no guarding.  EXT: No edema, no calf tenderness, distal pulses 2+ bilaterally  NEURO: A&Ox3. Moving all extremities. Sensation and strength intact throughout.   SKIN: Warm and dry.   PSYCH: Normal affect.

## 2024-01-27 NOTE — H&P ADULT - TIME BILLING
77 minutes spent on total encounter which excludes time spent teaching.    Independently obtaining a history, performing a physical examination, discussing the plan with the patient, ordering medications/tests, documenting clinical information, and coordinating care.

## 2024-01-27 NOTE — H&P ADULT - NSICDXFAMILYHX_GEN_ALL_CORE_FT
[Well Nourished] : well nourished [No Acute Distress] : no acute distress [Well-Appearing] : well-appearing FAMILY HISTORY:  Mother  Still living? Unknown  FHx: diabetes mellitus, Age at diagnosis: Age Unknown    Sibling  Still living? Unknown  FH: lung cancer, Age at diagnosis: Age Unknown     [Well Developed] : well developed [Normal Voice/Communication] : normal voice/communication [Normal Sclera/Conjunctiva] : normal sclera/conjunctiva [PERRL] : pupils equal round and reactive to light [Normal Outer Ear/Nose] : the outer ears and nose were normal in appearance [Normal Oropharynx] : the oropharynx was normal [Normal TMs] : both tympanic membranes were normal [No Lymphadenopathy] : no lymphadenopathy [No JVD] : no jugular venous distention [Supple] : supple [Thyroid Normal, No Nodules] : the thyroid was normal and there were no nodules present [No Respiratory Distress] : no respiratory distress  [No Accessory Muscle Use] : no accessory muscle use [Clear to Auscultation] : lungs were clear to auscultation bilaterally [Normal Rate] : normal rate  [Regular Rhythm] : with a regular rhythm [Normal S1, S2] : normal S1 and S2 [No Carotid Bruits] : no carotid bruits [No Murmur] : no murmur heard [No Abdominal Bruit] : a ~M bruit was not heard ~T in the abdomen [Pedal Pulses Present] : the pedal pulses are present [No Edema] : there was no peripheral edema [No Varicosities] : no varicosities [No Palpable Aorta] : no palpable aorta [Soft] : abdomen soft [Non Tender] : non-tender [No Masses] : no abdominal mass palpated [Non-distended] : non-distended [Normal Bowel Sounds] : normal bowel sounds [No HSM] : no HSM [Normal Posterior Cervical Nodes] : no posterior cervical lymphadenopathy [Declined Rectal Exam] : declined rectal exam [Normal Supraclavicular Nodes] : no supraclavicular lymphadenopathy [Normal Anterior Cervical Nodes] : no anterior cervical lymphadenopathy [No Spinal Tenderness] : no spinal tenderness [No Joint Swelling] : no joint swelling [Grossly Normal Strength/Tone] : grossly normal strength/tone [No Rash] : no rash [No Focal Deficits] : no focal deficits [Normal Gait] : normal gait [Coordination Grossly Intact] : coordination grossly intact [Normal Affect] : the affect was normal [Speech Grossly Normal] : speech grossly normal [Normal Insight/Judgement] : insight and judgment were intact [Normal Mood] : the mood was normal

## 2024-01-27 NOTE — ED PROVIDER NOTE - ATTENDING CONTRIBUTION TO CARE
I, Fern Roe MD, personally saw the patient with the resident, and completed the key components of the history and physical exam. I then discussed the management plan with the resident.

## 2024-01-27 NOTE — ED ADULT NURSE NOTE - CHIEF COMPLAINT QUOTE
patient brought in by EMS from home with aide c/o nausea.  aide reports patient has had nausea and intermittent dizziness for the last few days accompanied with urinary symptoms.  was recently started on antibiotics.

## 2024-01-27 NOTE — PHARMACOTHERAPY INTERVENTION NOTE - COMMENTS
Medication history complete. Medications and allergies reviewed with patient's caregiver, Yuli at bedside and confirmed with .

## 2024-01-27 NOTE — ED PROVIDER NOTE - PROGRESS NOTE DETAILS
Ryanne Heml DO (PGY3): Patient noted to be hyponatremic, serum osmole's and urine studies sent, pending results.  Patient with persistent UTI on urinalysis.  Patient given IV ceftriaxone.    CT negative for acute pathology.  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 Patient to be admitted to medicine for UTI with hyponatremia.  Spoke with hospitalist will admit to their service. pt seen and examined with resident dr barr. 89 yo F with 1 week, nausea and weakness. dec po. +chills. dx with uti and on bactrim x 2 days. no improvement. vss, gen chronically ill appearing , nad, cvs rrr, lungs ctab, abd benign, dry mm, PLan for sepsis labs, ua, iv abx, ekg, trop, ct abd/pel. will  need admit. MD HARRY

## 2024-01-27 NOTE — ED ADULT NURSE REASSESSMENT NOTE - NS ED NURSE REASSESS COMMENT FT1
pt remains aox3, extremely [place], pt with acute UTI, hyponatremia. Pt medicated with rocephin IVP. Admitted. Pt's NA Yuli at bedside. Pt denies need for pain med, repositioned for comfort.

## 2024-01-27 NOTE — H&P ADULT - HISTORY OF PRESENT ILLNESS
Pt is a 89-year-old female past medical history rectal prolapse, arthritis who p/w  nausea, vomiting  and weakness associated with intermittent chills and decreased appetite x 1 week ,  patient diagnosed with a UTI outpatient a few days ago and has been taking Bactrim as prescribed for the past 3 days.  Patient presents to the ED today due to persistent nausea and difficulty eating.  Denies diarrhea, constipation, vomiting.     In the ED, EKG NSR, received Ceftriaxone. CXR: increased vascularity, spot on L lung, possible nodule?(personally reviewed, fu final read).  Pt is a 89-year-old female past medical history rectal prolapse, arthritis who p/w  nausea, vomiting  and weakness associated with intermittent chills and decreased appetite x 1 wee , patient diagnosed with a UTI outpatient a few days ago and has been taking Bactrim as prescribed for the past 3 days.  Patient presents to the ED today due to persistent nausea and difficulty eating.  Denies diarrhea, constipation, vomiting.     In the ED, EKG NSR, received Ceftriaxone. CXR: increased vascularity, spot on L lung, possible nodule?(personally reviewed, fu final read).

## 2024-01-27 NOTE — ED ADULT NURSE NOTE - NS PRO PASSIVE SMOKE EXP
56 year old male with a remote history of TB treated in 1991 presents for pre employment CXR s/p positive QuantiFeron Test. Patient states that his QuantiFeron is always positive although he is asymptomatic without fever, chills, cough, night sweats, shortness of breath, or other new/concerning symptoms.
Unknown

## 2024-01-27 NOTE — H&P ADULT - NSHPPHYSICALEXAM_GEN_ALL_CORE
Vital Signs Last 24 Hrs  T(C): 36.5 (27 Jan 2024 18:55), Max: 36.5 (27 Jan 2024 18:55)  T(F): 97.7 (27 Jan 2024 18:55), Max: 97.7 (27 Jan 2024 18:55)  HR: 71 (27 Jan 2024 18:55) (71 - 77)  BP: 126/72 (27 Jan 2024 18:55) (126/72 - 151/68)  BP(mean): 83 (27 Jan 2024 18:55) (83 - 83)  RR: 17 (27 Jan 2024 18:55) (17 - 17)  SpO2: 97% (27 Jan 2024 18:55) (97% - 97%)    Parameters below as of 27 Jan 2024 18:55  Patient On (Oxygen Delivery Method): room air        General: WN/WD NAD  Head- Atraumatic, normocephalic   Neurology: A&Ox2, nonfocal,   HEENT- PERRLA, dry  muscous membrane  Neck-supple, no JVD  Respiratory: Air entry equal b/l, CTA   CVS:  S1S2, no murmurs, rubs or gallops  Abdominal: Soft, NT, ND +BS,   Extremities: No edema, + peripheral pulses  Skin- no rash, no ulcer

## 2024-01-27 NOTE — ED PROVIDER NOTE - OBJECTIVE STATEMENT
89-year-old female past medical history rectal prolapse, arthritis presenting to the emergency department with about a week of nausea and weakness associated with intermittent chills and decreased appetite,  patient diagnosed with a UTI outpatient a few days ago and has been taking Bactrim as prescribed for the past 3 days.  Patient presents to the ED today due to persistent nausea and difficulty eating.  Denies diarrhea, constipation, vomiting.  No headache, dysuria, urinary frequency, hematuria.  Patient has persistent cough at baseline without any new changes.  Denies chest pain, SOB, sore throat.

## 2024-01-27 NOTE — ED PROVIDER NOTE - CLINICAL SUMMARY MEDICAL DECISION MAKING FREE TEXT BOX
89-year-old female past medical history rectal prolapse, arthritis presenting to the emergency department with about a week of nausea and weakness associated with intermittent chills and decreased appetite,  patient diagnosed with a UTI outpatient a few days ago and has been taking Bactrim as prescribed for the past 3 days. Given hx and physical, ddx includes but is not limited to uti, metabolic derangement, intra-abdominal pathology, cardiac pathology less likely (though given nausea/weakness and age, will eval for ACS with ecg and trop). Plan for labs, ecg, xr, ua, ct, meds, reassess

## 2024-01-27 NOTE — PATIENT PROFILE ADULT - HOME ACCESSIBILITY CONCERNS - OTHER
Pt states she has difficulty with stairs in her home and that she has a ramp but she has been having difficulty going up the ramp

## 2024-01-27 NOTE — ED ADULT NURSE NOTE - NSFALLHARMRISKINTERV_ED_ALL_ED
Assistance OOB with selected safe patient handling equipment if applicable/Assistance with ambulation/Communicate risk of Fall with Harm to all staff, patient, and family/Monitor gait and stability/Provide patient with walking aids/Provide visual cue: red socks, yellow wristband, yellow gown, etc/Reinforce activity limits and safety measures with patient and family/Toileting schedule using arm’s reach rule for commode and bathroom/Bed in lowest position, wheels locked, appropriate side rails in place/Call bell, personal items and telephone in reach/Instruct patient to call for assistance before getting out of bed/chair/stretcher/Non-slip footwear applied when patient is off stretcher/[place] to call system/Physically safe environment - no spills, clutter or unnecessary equipment/Purposeful Proactive Rounding/Room/bathroom lighting operational, light cord in reach

## 2024-01-27 NOTE — H&P ADULT - NSHPREVIEWOFSYSTEMS_GEN_ALL_CORE
REVIEW OF SYSTEMS:  CONSTITUTIONAL: + weakness, no fevers or chills  EYES/ENT: No visual changes;  No vertigo or throat pain   NECK: No pain or stiffness  RESPIRATORY: No cough, wheezing, hemoptysis; No shortness of breath  CARDIOVASCULAR: No chest pain or palpitations  GASTROINTESTINAL: No abdominal or epigastric pain. + nausea, vomiting; No diarrhea or constipation. No melena or hematochezia.  GENITOURINARY: No dysuria, frequency or hematuria  NEUROLOGICAL: No numbness or weakness  SKIN: No itching, rashes

## 2024-01-27 NOTE — H&P ADULT - NSHPLABSRESULTS_GEN_ALL_CORE
< from: CT Abdomen and Pelvis w/ IV Cont (01.27.24 @ 15:23) >    INTERPRETATION:  CLINICAL INFORMATION: Nausea, vomiting    COMPARISON: CT abdomen and pelvis 7/14/2021    CONTRAST/COMPLICATIONS:  IV Contrast: Omnipaque 350  90 cc administered   0 cc discarded  Oral Contrast: NONE  Complications: None reported at time of study completion    PROCEDURE:  CT of the Abdomen and Pelvis was performed.  Sagittal and coronal reformats were performed.    FINDINGS:  LOWER CHEST: Clear.    LIVER: Normal.  BILE DUCTS: Nondilated.  GALLBLADDER: Normal.  SPLEEN: Normal.  PANCREAS: Normal.  ADRENALS: Normal.  KIDNEYS/URETERS: Stable appearance of left UPJ obstruction and with   severe dilatation of the left renal collecting system. Several stones in   the left renal collecting system. There is mild wall thickening and   urothelial enhancement within the dilated collecting system.    BLADDER: Cystocele and rest.  REPRODUCTIVE ORGANS: Descent of the pelvic organs.    BOWEL: Moderate hiatal hernia. No bowel obstruction or bowel dilatation.  PERITONEUM: No free air or ascites.  VESSELS: Aortoiliac atherosclerosis without aneurysm.  RETROPERITONEUM/LYMPH NODES: No adenopathy.  ABDOMINAL WALL: Small fat-containing umbilical hernia.  BONES: No acute bony abnormality.    IMPRESSION:  *  No bowel obstruction or bowel inflammation.  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  *  Stable appearance of left UPJ obstruction and with severe dilatation   of the left renal collecting system. There is mild wall thickening and   urothelial enhancement within the dilated collecting system. Superimposed   UTI cannot be excluded.        < end of copied text >

## 2024-01-27 NOTE — H&P ADULT - ASSESSMENT
Pt is a 89-year-old female past medical history rectal prolapse, arthritis who p/w  nausea, vomiting  and weakness associated with intermittent chills and decreased appetite x 1 week ,  patient diagnosed with a UTI outpatient a few days ago and has been taking Bactrim as prescribed for the past 3 days.       #Complicated UTI with failure of outpatient treatment   -UA with pyuria, large blood  -CT demonstrating mild wall thickening and urothelial enhancemnt within dilated collecting system, stones in left renal collecting system  -pt also with diffuse pelvic organ descent which predisposes to further UTIs, might benefit from outpatient UroGyn eval  -Ceftriaxone 1gm q24  -fu UCx    #Stable Left UPJ obstruction w/ severe dilatation of renal collecting system  -urology consulted by ED    #Moderate Hyponatremia   -likely hypovolemic 2/2 dehydration, pt has had nausea, vomiting, decreased appetite during the last week   -appears dry on exam   -1 L NS   -fu bmp in AM     #Decreased GFR   -in setting of dehydration vs age related  -monitor renal fxn     #DVT PPx   -subq heparin        Pt is a 89-year-old female past medical history rectal prolapse, arthritis who p/w  nausea, vomiting  and weakness associated with intermittent chills and decreased appetite x 1 week ,  patient diagnosed with a UTI outpatient a few days ago and has been taking Bactrim as prescribed for the past 3 days.       #Complicated UTI with failure of outpatient treatment   -UA with pyuria, large blood  -CT demonstrating mild wall thickening and urothelial enhancemnt within dilated collecting system, stones in left renal collecting system  -pt also with diffuse pelvic organ descent which predisposes to further UTIs, might benefit from outpatient UroGyn eval  -Ceftriaxone 1gm q24  -fu UCx    #Stable Left UPJ obstruction w/ severe dilatation of renal collecting system  -urology consulted by ED    #Moderate Hyponatremia   -likely hypovolemic 2/2 dehydration, pt has had nausea, vomiting, decreased appetite during the last week   -appears dry on exam   -1 L NS   -fu bmp      #Decreased GFR   #Proteinuria  -in setting of dehydration vs age related  -check urine p/c ratio   -monitor renal fxn     #DVT PPx   -subq heparin        Pt is a 89-year-old female past medical history rectal prolapse, arthritis who p/w  nausea, vomiting  and weakness associated with intermittent chills and decreased appetite x 1 week ,  patient diagnosed with a UTI outpatient a few days ago and has been taking Bactrim as prescribed for the past 3 days.       #UTI with failure of outpatient treatment   -UA with pyuria, large blood  -CT demonstrating mild wall thickening and urothelial enhancement within dilated collecting system, stones in left renal collecting system  -Pt also with diffuse pelvic organ descent which predisposes to further UTIs, might benefit from outpatient Uro/Gyn eval  -Ceftriaxone 1gm q24  -fu UCx    #Stable Left UPJ obstruction w/ severe dilatation of renal collecting system  -urology consulted by ED    #Moderate Hyponatremia   - Na of 123  -likely hypovolemic 2/2 dehydration, pt has had nausea, vomiting, decreased appetite during the last week   -appears dry on exam   -1 L NS   -fu bmp      #Decreased GFR   #Proteinuria  -in setting of dehydration vs age related  -check urine p/c ratio   -monitor renal fxn     #DVT PPx   -subq heparin

## 2024-01-27 NOTE — PATIENT PROFILE ADULT - FALL HARM RISK - HARM RISK INTERVENTIONS

## 2024-01-27 NOTE — H&P ADULT - ATTENDING COMMENTS
Patient seen and observed at bedside. Agree with tx plan above. Pt with UTI likely 2/2 to chronic UPJ obstruction and moderate hyponatremia, likely hypovolemic. Will treat with ceftriaxone and Normal Saline. Will follow BMP and cultures.

## 2024-01-28 DIAGNOSIS — E87.1 HYPO-OSMOLALITY AND HYPONATREMIA: ICD-10-CM

## 2024-01-28 DIAGNOSIS — R80.9 PROTEINURIA, UNSPECIFIED: ICD-10-CM

## 2024-01-28 DIAGNOSIS — N39.0 URINARY TRACT INFECTION, SITE NOT SPECIFIED: ICD-10-CM

## 2024-01-28 LAB
ANION GAP SERPL CALC-SCNC: 6 MMOL/L — SIGNIFICANT CHANGE UP (ref 5–17)
ANION GAP SERPL CALC-SCNC: 6 MMOL/L — SIGNIFICANT CHANGE UP (ref 5–17)
BASOPHILS # BLD AUTO: 0.01 K/UL — SIGNIFICANT CHANGE UP (ref 0–0.2)
BASOPHILS NFR BLD AUTO: 0.2 % — SIGNIFICANT CHANGE UP (ref 0–2)
BUN SERPL-MCNC: 13 MG/DL — SIGNIFICANT CHANGE UP (ref 7–23)
BUN SERPL-MCNC: 15 MG/DL — SIGNIFICANT CHANGE UP (ref 7–23)
CALCIUM SERPL-MCNC: 8.4 MG/DL — LOW (ref 8.5–10.1)
CALCIUM SERPL-MCNC: 8.5 MG/DL — SIGNIFICANT CHANGE UP (ref 8.5–10.1)
CHLORIDE SERPL-SCNC: 100 MMOL/L — SIGNIFICANT CHANGE UP (ref 96–108)
CHLORIDE SERPL-SCNC: 102 MMOL/L — SIGNIFICANT CHANGE UP (ref 96–108)
CO2 SERPL-SCNC: 21 MMOL/L — LOW (ref 22–31)
CO2 SERPL-SCNC: 24 MMOL/L — SIGNIFICANT CHANGE UP (ref 22–31)
CREAT ?TM UR-MCNC: 34 MG/DL — SIGNIFICANT CHANGE UP
CREAT SERPL-MCNC: 0.84 MG/DL — SIGNIFICANT CHANGE UP (ref 0.5–1.3)
CREAT SERPL-MCNC: 0.92 MG/DL — SIGNIFICANT CHANGE UP (ref 0.5–1.3)
EGFR: 58 ML/MIN/1.73M2 — LOW
EGFR: 64 ML/MIN/1.73M2 — SIGNIFICANT CHANGE UP
EOSINOPHIL # BLD AUTO: 0.03 K/UL — SIGNIFICANT CHANGE UP (ref 0–0.5)
EOSINOPHIL NFR BLD AUTO: 0.6 % — SIGNIFICANT CHANGE UP (ref 0–6)
GLUCOSE SERPL-MCNC: 130 MG/DL — HIGH (ref 70–99)
GLUCOSE SERPL-MCNC: 84 MG/DL — SIGNIFICANT CHANGE UP (ref 70–99)
HCT VFR BLD CALC: 29.4 % — LOW (ref 34.5–45)
HGB BLD-MCNC: 10.2 G/DL — LOW (ref 11.5–15.5)
IMM GRANULOCYTES NFR BLD AUTO: 0.4 % — SIGNIFICANT CHANGE UP (ref 0–0.9)
LYMPHOCYTES # BLD AUTO: 1.64 K/UL — SIGNIFICANT CHANGE UP (ref 1–3.3)
LYMPHOCYTES # BLD AUTO: 33.8 % — SIGNIFICANT CHANGE UP (ref 13–44)
MCHC RBC-ENTMCNC: 31.4 PG — SIGNIFICANT CHANGE UP (ref 27–34)
MCHC RBC-ENTMCNC: 34.7 GM/DL — SIGNIFICANT CHANGE UP (ref 32–36)
MCV RBC AUTO: 90.5 FL — SIGNIFICANT CHANGE UP (ref 80–100)
MONOCYTES # BLD AUTO: 0.79 K/UL — SIGNIFICANT CHANGE UP (ref 0–0.9)
MONOCYTES NFR BLD AUTO: 16.3 % — HIGH (ref 2–14)
NEUTROPHILS # BLD AUTO: 2.36 K/UL — SIGNIFICANT CHANGE UP (ref 1.8–7.4)
NEUTROPHILS NFR BLD AUTO: 48.7 % — SIGNIFICANT CHANGE UP (ref 43–77)
OSMOLALITY SERPL: 261 MOSMOL/KG — LOW (ref 280–301)
PLATELET # BLD AUTO: 231 K/UL — SIGNIFICANT CHANGE UP (ref 150–400)
POTASSIUM SERPL-MCNC: 3.8 MMOL/L — SIGNIFICANT CHANGE UP (ref 3.5–5.3)
POTASSIUM SERPL-MCNC: 3.9 MMOL/L — SIGNIFICANT CHANGE UP (ref 3.5–5.3)
POTASSIUM SERPL-SCNC: 3.8 MMOL/L — SIGNIFICANT CHANGE UP (ref 3.5–5.3)
POTASSIUM SERPL-SCNC: 3.9 MMOL/L — SIGNIFICANT CHANGE UP (ref 3.5–5.3)
RBC # BLD: 3.25 M/UL — LOW (ref 3.8–5.2)
RBC # FLD: 13.8 % — SIGNIFICANT CHANGE UP (ref 10.3–14.5)
SODIUM SERPL-SCNC: 127 MMOL/L — LOW (ref 135–145)
SODIUM SERPL-SCNC: 132 MMOL/L — LOW (ref 135–145)
SODIUM UR-SCNC: 58 MMOL/L — SIGNIFICANT CHANGE UP
WBC # BLD: 4.85 K/UL — SIGNIFICANT CHANGE UP (ref 3.8–10.5)
WBC # FLD AUTO: 4.85 K/UL — SIGNIFICANT CHANGE UP (ref 3.8–10.5)

## 2024-01-28 PROCEDURE — 99232 SBSQ HOSP IP/OBS MODERATE 35: CPT

## 2024-01-28 RX ORDER — THIAMINE MONONITRATE (VIT B1) 100 MG
100 TABLET ORAL DAILY
Refills: 0 | Status: CANCELLED | OUTPATIENT
Start: 2024-01-28 | End: 2024-01-31

## 2024-01-28 RX ORDER — MULTIVIT-MIN/FERROUS GLUCONATE 9 MG/15 ML
1 LIQUID (ML) ORAL DAILY
Refills: 0 | Status: CANCELLED | OUTPATIENT
Start: 2024-01-28 | End: 2024-01-31

## 2024-01-28 RX ORDER — SODIUM CHLORIDE 9 MG/ML
1000 INJECTION INTRAMUSCULAR; INTRAVENOUS; SUBCUTANEOUS
Refills: 0 | Status: DISCONTINUED | OUTPATIENT
Start: 2024-01-28 | End: 2024-01-28

## 2024-01-28 RX ADMIN — HEPARIN SODIUM 5000 UNIT(S): 5000 INJECTION INTRAVENOUS; SUBCUTANEOUS at 22:08

## 2024-01-28 RX ADMIN — SODIUM CHLORIDE 75 MILLILITER(S): 9 INJECTION INTRAMUSCULAR; INTRAVENOUS; SUBCUTANEOUS at 05:58

## 2024-01-28 RX ADMIN — CEFTRIAXONE 1000 MILLIGRAM(S): 500 INJECTION, POWDER, FOR SOLUTION INTRAMUSCULAR; INTRAVENOUS at 00:43

## 2024-01-28 RX ADMIN — HEPARIN SODIUM 5000 UNIT(S): 5000 INJECTION INTRAVENOUS; SUBCUTANEOUS at 09:35

## 2024-01-28 NOTE — DIETITIAN INITIAL EVALUATION ADULT - PERTINENT LABORATORY DATA
01-28    132<L>  |  102  |  13  ----------------------------<  84  3.8   |  24  |  0.92    Ca    8.5      28 Jan 2024 06:37  Mg     2.0     01-27    TPro  6.9  /  Alb  3.1<L>  /  TBili  0.5  /  DBili  x   /  AST  15  /  ALT  24  /  AlkPhos  77  01-27

## 2024-01-28 NOTE — DIETITIAN NUTRITION RISK NOTIFICATION - ADDITIONAL COMMENTS/DIETITIAN RECOMMENDATIONS
1. Recommend c/w Regular diet w/ Minced Meats ONLY  2. Consider SLP eval to assess for safest/ least restrictive diet consistency/ texture   3. Add ensure plus high protein BID to optimize PO intake (provides 350 kcal, 20g protein/ shake)   4. Consider adding thiamine 100 mg daily 2/2 poor PO intake/ malnutrition   5. Recommend MVI w/ minerals daily to ensure 100% RDA met   6. Encourage protein-rich foods, maximize food preferences   7. Monitor bowel movements, if no BM for >3 days, consider implementing bowel regimen.   8. Encourage adequate hydration 2/2 poor PO intake  9. Obtain vitamin D 25OH level to assess nutriture   10. Please obtain weekly weights   11. Confirm goals of care regarding nutrition support   RD will continue to monitor PO intake, labs, hydration, and wt prn.  1. Recommend Soft and Bite Sized w/ Minced Meats ONLY  2. Consider SLP eval to assess for safest/ least restrictive diet consistency/ texture   3. Add ensure plus high protein BID to optimize PO intake (provides 350 kcal, 20g protein/ shake)   4. Consider adding thiamine 100 mg daily 2/2 poor PO intake/ malnutrition   5. Recommend MVI w/ minerals daily to ensure 100% RDA met   6. Encourage protein-rich foods, maximize food preferences   7. Monitor bowel movements, if no BM for >3 days, consider implementing bowel regimen.   8. Encourage adequate hydration 2/2 poor PO intake  9. Obtain vitamin D 25OH level to assess nutriture   10. Please obtain weekly weights   11. Confirm goals of care regarding nutrition support   RD will continue to monitor PO intake, labs, hydration, and wt prn.

## 2024-01-28 NOTE — PROGRESS NOTE ADULT - ASSESSMENT
89-year-old female past medical history rectal prolapse, arthritis who p/w  nausea, vomiting  and weakness associated with intermittent chills and decreased appetite x 1 week ,  patient diagnosed with a UTI outpatient a few days ago and has been taking Bactrim as prescribed for the past 3 days.       #UTI with failure of outpatient treatment   -UA with pyuria, large blood  -CT demonstrating mild wall thickening and urothelial enhancement within dilated collecting system, stones in left renal collecting system  -Pt also with diffuse pelvic organ descent which predisposes to further UTIs, might benefit from outpatient Uro/Gyn eval  -Ceftriaxone 1gm q24  -fu UCx    #Stable Left UPJ obstruction w/ severe dilatation of renal collecting system  -urology consulted     #Moderate Hyponatremia   - Na of 123 improved to 132- stop further IVF for now  -likely hypovolemic 2/2 dehydration, pt has had nausea, vomiting, decreased appetite during the last week   -appears dry on exam   s/p 1 L NS   -fu bmp      #Decreased GFR   #Proteinuria  -in setting of dehydration vs age related  -check urine p/c ratio   -monitor renal fxn     #DVT PPx   -subq heparin             Problem/Plan - 1   ·  Problem: Acute UTI.     Problem/Plan - 2   ·  Problem: Hyponatremia.     Problem/Plan - 3   ·  Problem: Proteinuria.

## 2024-01-28 NOTE — DIETITIAN INITIAL EVALUATION ADULT - ORAL INTAKE PTA/DIET HISTORY
Pt lives at home alone, has HHA that cooks for her. Consumes 3 small meals / day - likely meeting <75% ENN chronically. Endorses difficulty chewing meats, needs minced meats. Reports decreased appetite w/ N/V x 1 week.

## 2024-01-28 NOTE — DIETITIAN INITIAL EVALUATION ADULT - OTHER INFO
Pt is a 89-year-old female past medical history rectal prolapse, arthritis who p/w  nausea, vomiting  and weakness associated with intermittent chills and decreased appetite x 1 wee , patient diagnosed with a UTI outpatient a few days ago and has been taking Bactrim as prescribed for the past 3 days. Pt presents to the ED  due to persistent nausea and difficulty eating. Adm w/ UTI, hyponatremia, Stable Left UPJ obstruction w/ severe dilatation of renal collecting system, proteinuria.     Pt reporting fair appetite at present, requesting bkfst tray at time of RD visit. Unsure of UBW, no recent wt hx to review. RD obtained bed scale wt of 106.8# on 1/28. NFPE reveals moderate-severe muscle/fat wasting - appears thin, frail. 1+ R ankle edema could be masking further losses, skewing appearance. Consider SLP eval to assess for safest/ least restrictive diet consistency/ texture. Recommend Regular diet with minced meats only. Recommend Add ensure plus high protein BID to optimize PO intake (provides 350 kcal, 20g protein/ shake). See below for further recommendations.  Pt is a 89-year-old female past medical history rectal prolapse, arthritis who p/w  nausea, vomiting  and weakness associated with intermittent chills and decreased appetite x 1 wee , patient diagnosed with a UTI outpatient a few days ago and has been taking Bactrim as prescribed for the past 3 days. Pt presents to the ED  due to persistent nausea and difficulty eating. Adm w/ UTI, hyponatremia, Stable Left UPJ obstruction w/ severe dilatation of renal collecting system, proteinuria.     Pt reporting fair appetite at present, requesting bkfst tray at time of RD visit. Unsure of UBW, no recent wt hx to review. RD obtained bed scale wt of 106.8# on 1/28. NFPE reveals moderate-severe muscle/fat wasting - appears thin, frail. 1+ R ankle edema could be masking further losses, skewing appearance. Consider SLP eval to assess for safest/ least restrictive diet consistency/ texture. Recommend Soft and Bite Sized diet with minced meats only 2/2 difficulty chewing. Recommend Add ensure plus high protein BID to optimize PO intake (provides 350 kcal, 20g protein/ shake). See below for further recommendations.

## 2024-01-28 NOTE — DIETITIAN INITIAL EVALUATION ADULT - NSFNSGIIOFT_GEN_A_CORE
I&O's Detail    27 Jan 2024 07:01  -  28 Jan 2024 07:00  --------------------------------------------------------  IN:  Total IN: 0 mL    OUT:    Voided (mL): 250 mL  Total OUT: 250 mL    Total NET: -250 mL

## 2024-01-28 NOTE — DIETITIAN INITIAL EVALUATION ADULT - ETIOLOGY
r/t decreased ability to meet increased nutrient needs 2/2 UTI on arthritis, hiatal hernia, rectal prolapse

## 2024-01-28 NOTE — DIETITIAN INITIAL EVALUATION ADULT - PERTINENT MEDS FT
MEDICATIONS  (STANDING):  cefTRIAXone Injectable. 1000 milliGRAM(s) IV Push every 24 hours  heparin   Injectable 5000 Unit(s) SubCutaneous every 12 hours    MEDICATIONS  (PRN):  acetaminophen     Tablet .. 650 milliGRAM(s) Oral every 6 hours PRN Temp greater or equal to 38C (100.4F), Mild Pain (1 - 3)  aluminum hydroxide/magnesium hydroxide/simethicone Suspension 30 milliLiter(s) Oral every 4 hours PRN Dyspepsia  melatonin 3 milliGRAM(s) Oral at bedtime PRN Insomnia  ondansetron Injectable 4 milliGRAM(s) IV Push every 8 hours PRN Nausea and/or Vomiting

## 2024-01-28 NOTE — DIETITIAN INITIAL EVALUATION ADULT - ADD RECOMMEND
1. Recommend c/w Regular diet w/ Minced Meats ONLY  2. Consider SLP eval to assess for safest/ least restrictive diet consistency/ texture   3. Add ensure plus high protein BID to optimize PO intake (provides 350 kcal, 20g protein/ shake)   4. Consider adding thiamine 100 mg daily 2/2 poor PO intake/ malnutrition   5. Recommend MVI w/ minerals daily to ensure 100% RDA met   6. Encourage protein-rich foods, maximize food preferences   7. Monitor bowel movements, if no BM for >3 days, consider implementing bowel regimen.   8. Encourage adequate hydration 2/2 poor PO intake  9. Obtain vitamin D 25OH level to assess nutriture   10. Please obtain weekly weights   11. Confirm goals of care regarding nutrition support   RD will continue to monitor PO intake, labs, hydration, and wt prn.  1. Recommend c/w Soft and Bite Sized w/ Minced Meats ONLY  2. Consider SLP eval to assess for safest/ least restrictive diet consistency/ texture   3. Add ensure plus high protein BID to optimize PO intake (provides 350 kcal, 20g protein/ shake)   4. Consider adding thiamine 100 mg daily 2/2 poor PO intake/ malnutrition   5. Recommend MVI w/ minerals daily to ensure 100% RDA met   6. Encourage protein-rich foods, maximize food preferences   7. Monitor bowel movements, if no BM for >3 days, consider implementing bowel regimen.   8. Encourage adequate hydration 2/2 poor PO intake  9. Obtain vitamin D 25OH level to assess nutriture   10. Please obtain weekly weights   11. Confirm goals of care regarding nutrition support   RD will continue to monitor PO intake, labs, hydration, and wt prn.  1. Recommend Soft and Bite Sized w/ Minced Meats ONLY  2. Consider SLP eval to assess for safest/ least restrictive diet consistency/ texture   3. Add ensure plus high protein BID to optimize PO intake (provides 350 kcal, 20g protein/ shake)   4. Consider adding thiamine 100 mg daily 2/2 poor PO intake/ malnutrition   5. Recommend MVI w/ minerals daily to ensure 100% RDA met   6. Encourage protein-rich foods, maximize food preferences   7. Monitor bowel movements, if no BM for >3 days, consider implementing bowel regimen.   8. Encourage adequate hydration 2/2 poor PO intake  9. Obtain vitamin D 25OH level to assess nutriture   10. Please obtain weekly weights   11. Confirm goals of care regarding nutrition support   RD will continue to monitor PO intake, labs, hydration, and wt prn.

## 2024-01-29 DIAGNOSIS — N20.0 CALCULUS OF KIDNEY: ICD-10-CM

## 2024-01-29 DIAGNOSIS — N13.5 CROSSING VESSEL AND STRICTURE OF URETER WITHOUT HYDRONEPHROSIS: ICD-10-CM

## 2024-01-29 LAB
ANION GAP SERPL CALC-SCNC: 6 MMOL/L — SIGNIFICANT CHANGE UP (ref 5–17)
BASOPHILS # BLD AUTO: 0.03 K/UL — SIGNIFICANT CHANGE UP (ref 0–0.2)
BASOPHILS NFR BLD AUTO: 0.7 % — SIGNIFICANT CHANGE UP (ref 0–2)
BUN SERPL-MCNC: 14 MG/DL — SIGNIFICANT CHANGE UP (ref 7–23)
CALCIUM SERPL-MCNC: 8.4 MG/DL — LOW (ref 8.5–10.1)
CHLORIDE SERPL-SCNC: 109 MMOL/L — HIGH (ref 96–108)
CO2 SERPL-SCNC: 21 MMOL/L — LOW (ref 22–31)
CREAT SERPL-MCNC: 0.91 MG/DL — SIGNIFICANT CHANGE UP (ref 0.5–1.3)
CULTURE RESULTS: SIGNIFICANT CHANGE UP
EGFR: 58 ML/MIN/1.73M2 — LOW
EOSINOPHIL # BLD AUTO: 0.05 K/UL — SIGNIFICANT CHANGE UP (ref 0–0.5)
EOSINOPHIL NFR BLD AUTO: 1.2 % — SIGNIFICANT CHANGE UP (ref 0–6)
GLUCOSE SERPL-MCNC: 89 MG/DL — SIGNIFICANT CHANGE UP (ref 70–99)
HCT VFR BLD CALC: 32.9 % — LOW (ref 34.5–45)
HGB BLD-MCNC: 11.1 G/DL — LOW (ref 11.5–15.5)
IMM GRANULOCYTES NFR BLD AUTO: 0.5 % — SIGNIFICANT CHANGE UP (ref 0–0.9)
LYMPHOCYTES # BLD AUTO: 1.37 K/UL — SIGNIFICANT CHANGE UP (ref 1–3.3)
LYMPHOCYTES # BLD AUTO: 32.2 % — SIGNIFICANT CHANGE UP (ref 13–44)
MCHC RBC-ENTMCNC: 31.4 PG — SIGNIFICANT CHANGE UP (ref 27–34)
MCHC RBC-ENTMCNC: 33.7 GM/DL — SIGNIFICANT CHANGE UP (ref 32–36)
MCV RBC AUTO: 93.2 FL — SIGNIFICANT CHANGE UP (ref 80–100)
MONOCYTES # BLD AUTO: 0.55 K/UL — SIGNIFICANT CHANGE UP (ref 0–0.9)
MONOCYTES NFR BLD AUTO: 12.9 % — SIGNIFICANT CHANGE UP (ref 2–14)
NEUTROPHILS # BLD AUTO: 2.24 K/UL — SIGNIFICANT CHANGE UP (ref 1.8–7.4)
NEUTROPHILS NFR BLD AUTO: 52.5 % — SIGNIFICANT CHANGE UP (ref 43–77)
PLATELET # BLD AUTO: 237 K/UL — SIGNIFICANT CHANGE UP (ref 150–400)
POTASSIUM SERPL-MCNC: 4.1 MMOL/L — SIGNIFICANT CHANGE UP (ref 3.5–5.3)
POTASSIUM SERPL-SCNC: 4.1 MMOL/L — SIGNIFICANT CHANGE UP (ref 3.5–5.3)
RBC # BLD: 3.53 M/UL — LOW (ref 3.8–5.2)
RBC # FLD: 14.1 % — SIGNIFICANT CHANGE UP (ref 10.3–14.5)
SODIUM SERPL-SCNC: 136 MMOL/L — SIGNIFICANT CHANGE UP (ref 135–145)
SPECIMEN SOURCE: SIGNIFICANT CHANGE UP
WBC # BLD: 4.26 K/UL — SIGNIFICANT CHANGE UP (ref 3.8–10.5)
WBC # FLD AUTO: 4.26 K/UL — SIGNIFICANT CHANGE UP (ref 3.8–10.5)

## 2024-01-29 PROCEDURE — 99223 1ST HOSP IP/OBS HIGH 75: CPT

## 2024-01-29 PROCEDURE — 99232 SBSQ HOSP IP/OBS MODERATE 35: CPT

## 2024-01-29 RX ADMIN — HEPARIN SODIUM 5000 UNIT(S): 5000 INJECTION INTRAVENOUS; SUBCUTANEOUS at 09:38

## 2024-01-29 RX ADMIN — CEFTRIAXONE 1000 MILLIGRAM(S): 500 INJECTION, POWDER, FOR SOLUTION INTRAMUSCULAR; INTRAVENOUS at 01:09

## 2024-01-29 RX ADMIN — HEPARIN SODIUM 5000 UNIT(S): 5000 INJECTION INTRAVENOUS; SUBCUTANEOUS at 22:02

## 2024-01-29 RX ADMIN — CEFTRIAXONE 1000 MILLIGRAM(S): 500 INJECTION, POWDER, FOR SOLUTION INTRAMUSCULAR; INTRAVENOUS at 22:01

## 2024-01-29 NOTE — CONSULT NOTE ADULT - SUBJECTIVE AND OBJECTIVE BOX
HPI:  Pt is a 89-year-old female past medical history rectal prolapse, arthritis who p/w  nausea, vomiting  and weakness associated with intermittent chills and decreased appetite x 1 wee , patient diagnosed with a UTI outpatient a few days ago and has been taking Bactrim as prescribed for the past 3 days.  Patient presents to the ED today due to persistent nausea and difficulty eating.  Denies diarrhea, constipation, vomiting.     In the ED, EKG NSR, received Ceftriaxone. CXR: increased vascularity, spot on L lung, possible nodule?(personally reviewed, fu final read).  (27 Jan 2024 20:44)      89 yo female with PMH as above admitted for UTI. Urology consulted for pt with UPJ obstruction on CT scan. Patient seen at bedside and is hard of hearing. Reports she was admitted for feeling weak and having chills for 1 week. She was placed on antibiotics for UTI outpatient, [facility] reviewed unable to find any prior UCx or labs. Pt denies any abd/flank pain, dysuria. Pt was seen for the same complaint in 7/2021 by Dr. Chavez and recommended to follow up outpatient.     PAST MEDICAL & SURGICAL HISTORY:  Hiatal hernia      Arthritis      Rectal prolapse      S/P hysterectomy  2009          REVIEW OF SYSTEMS     All other review of systems neg, except as noted in HPI    MEDICATIONS  (STANDING):  cefTRIAXone Injectable. 1000 milliGRAM(s) IV Push every 24 hours  heparin   Injectable 5000 Unit(s) SubCutaneous every 12 hours    MEDICATIONS  (PRN):  acetaminophen     Tablet .. 650 milliGRAM(s) Oral every 6 hours PRN Temp greater or equal to 38C (100.4F), Mild Pain (1 - 3)  aluminum hydroxide/magnesium hydroxide/simethicone Suspension 30 milliLiter(s) Oral every 4 hours PRN Dyspepsia  melatonin 3 milliGRAM(s) Oral at bedtime PRN Insomnia  ondansetron Injectable 4 milliGRAM(s) IV Push every 8 hours PRN Nausea and/or Vomiting      Allergies    No Known Allergies    Intolerances        SOCIAL HISTORY:    FAMILY HISTORY:  FHx: diabetes mellitus (Mother)    FH: lung cancer (Sibling)        Vital Signs Last 24 Hrs  T(C): 36.7 (29 Jan 2024 07:59), Max: 36.7 (28 Jan 2024 22:20)  T(F): 98.1 (29 Jan 2024 07:59), Max: 98.1 (28 Jan 2024 22:20)  HR: 73 (29 Jan 2024 07:59) (68 - 74)  BP: 112/69 (29 Jan 2024 07:59) (111/48 - 120/54)  BP(mean): --  RR: 16 (29 Jan 2024 07:59) (16 - 18)  SpO2: 97% (29 Jan 2024 07:59) (93% - 99%)    Parameters below as of 29 Jan 2024 07:59  Patient On (Oxygen Delivery Method): room air        PHYSICAL EXAM:    General: No distress, No anxiety  VITALS  T(C): 36.7 (01-29-24 @ 07:59), Max: 36.7 (01-28-24 @ 22:20)  HR: 73 (01-29-24 @ 07:59) (68 - 74)  BP: 112/69 (01-29-24 @ 07:59) (111/48 - 120/54)  RR: 16 (01-29-24 @ 07:59) (16 - 18)  SpO2: 97% (01-29-24 @ 07:59) (93% - 99%)            Skin     : No jaundice  HEENT: Normocephalic, No epistaxis  Lung    : No resp distress  Abdo:   : Soft, Non tender, No guarding, No distension   Back    : No CVAT b/l  Genitalia Female: No Phelps           LABS:                        11.1   4.26  )-----------( 237      ( 29 Jan 2024 06:17 )             32.9     01-29    136  |  109<H>  |  14  ----------------------------<  89  4.1   |  21<L>  |  0.91    Ca    8.4<L>      29 Jan 2024 06:17  Mg     2.0     01-27    TPro  6.9  /  Alb  3.1<L>  /  TBili  0.5  /  DBili  x   /  AST  15  /  ALT  24  /  AlkPhos  77  01-27      Urinalysis Basic - ( 29 Jan 2024 06:17 )    Color: x / Appearance: x / SG: x / pH: x  Gluc: 89 mg/dL / Ketone: x  / Bili: x / Urobili: x   Blood: x / Protein: x / Nitrite: x   Leuk Esterase: x / RBC: x / WBC x   Sq Epi: x / Non Sq Epi: x / Bacteria: x        RADIOLOGY & ADDITIONAL STUDIES:< from: CT Abdomen and Pelvis w/ IV Cont (01.27.24 @ 15:23) >  ACC: [id number] EXAM:  CT ABDOMEN AND PELVIS IC   ORDERED BY: SAVAGE SERRANOAZ     PROCEDURE DATE:  01/27/2024          INTERPRETATION:  CLINICAL INFORMATION: Nausea, vomiting    COMPARISON: CT abdomen and pelvis 7/14/2021    CONTRAST/COMPLICATIONS:  IV Contrast: Omnipaque 350  90 cc administered   0 cc discarded  Oral Contrast: NONE  Complications: None reported at time of study completion    PROCEDURE:  CT of the Abdomen and Pelvis was performed.  Sagittal and coronal reformats were performed.    FINDINGS:  LOWER CHEST: Clear.    LIVER: Normal.  BILE DUCTS: Nondilated.  GALLBLADDER: Normal.  SPLEEN: Normal.  PANCREAS: Normal.  ADRENALS: Normal.  KIDNEYS/URETERS: Stable appearance of left UPJ obstruction and with   severe dilatation of the left renal collecting system. Several stones in   the left renal collecting system. There is mild wall thickening and   urothelial enhancement within the dilated collecting system.    BLADDER: Cystocele and rest.  REPRODUCTIVE ORGANS: Descent of the pelvic organs.    BOWEL: Moderate hiatal hernia. No bowel obstruction or bowel dilatation.  PERITONEUM: No free air or ascites.  VESSELS: Aortoiliac atherosclerosis without aneurysm.  RETROPERITONEUM/LYMPH NODES: No adenopathy.  ABDOMINAL WALL: Small fat-containing umbilical hernia.  BONES: No acute bony abnormality.    IMPRESSION:  *  No bowel obstruction or bowel inflammation.  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  *  Stable appearance of left UPJ obstruction and with severe dilatation   of the left renal collecting system. There is mild wall thickening and   urothelial enhancement within the dilated collecting system. Superimposed   UTI cannot be excluded.      --- End of Report ---            MAURI BIANCHI MD; Attending Radiologist  This document has been electronically signed. Jan 27 2024  4:25PM    < end of copied text >

## 2024-01-29 NOTE — PHYSICAL THERAPY INITIAL EVALUATION ADULT - LIVES WITH, PROFILE
Pt has 24:7 aides in home, ramp to enter but pt unable to ambulate up/down the ramp. Pt says she is house bound. Has Visiting Nurses to assist./alone

## 2024-01-29 NOTE — PHYSICAL THERAPY INITIAL EVALUATION ADULT - GENERAL OBSERVATIONS, REHAB EVAL
Pt seen on 2S, NAD, finishing lunch, [place], alert and oriented for session. Willing and cooperative.

## 2024-01-29 NOTE — PROGRESS NOTE ADULT - ASSESSMENT
89-year-old female past medical history rectal prolapse, arthritis who p/w  nausea, vomiting  and weakness associated with intermittent chills and decreased appetite x 1 week ,  patient diagnosed with a UTI outpatient a few days ago and has been taking Bactrim as prescribed for the past 3 days.       #UTI with failure of outpatient treatment   -UA with pyuria, large blood  -Ceftriaxone 1gm q24  -fu UCx ecoli [id number]  urology consult appreciated - no acute intervention work up of UPJ obstruction can be done outpatient  - Follow up outpatient with Uro Gynecologist for pelvic organ/ bladder prolapse   -CT demonstrating mild wall thickening and urothelial enhancement within dilated collecting system, stones in left renal collecting system  -Pt also with diffuse pelvic organ descent which predisposes to further UTIs, might benefit from outpatient Uro/Gyn eval      #Stable Left UPJ obstruction w/ severe dilatation of renal collecting system  -urology consult appreciated     #Moderate Hyponatremia   - Na of 123 improved to 132-s/p IVF  -likely hypovolemic 2/2 dehydration, pt has had nausea, vomiting, decreased appetite during the last week   -appears dry on exam   s/p 1 L NS   -fu bmp      #Decreased GFR   #Proteinuria  -in setting of dehydration vs age related  -check urine p/c ratio   -monitor renal fxn     #DVT PPx   -subq heparin     Dispo- will treat for UTI  with abx due to underlying UPJ obstruction and stone , symptomatically better ,would consider switching to po abx ceftin in 24hrs for a total course of 7 days, also pending PT eval for discharge planning  89-year-old female past medical history rectal prolapse, arthritis who p/w  nausea, vomiting  and weakness associated with intermittent chills and decreased appetite x 1 week ,  patient diagnosed with a UTI outpatient a few days ago and has been taking Bactrim as prescribed for the past 3 days.       #UTI with failure of outpatient treatment   -UA with pyuria, large blood  -Ceftriaxone 1gm q24  -fu UCx neg  urology consult appreciated - no acute intervention work up of UPJ obstruction can be done outpatient  - Follow up outpatient with Uro Gynecologist for pelvic organ/ bladder prolapse   -CT demonstrating mild wall thickening and urothelial enhancement within dilated collecting system, stones in left renal collecting system  -Pt also with diffuse pelvic organ descent which predisposes to further UTIs, might benefit from outpatient Uro/Gyn eval      #Stable Left UPJ obstruction w/ severe dilatation of renal collecting system  -urology consult appreciated     #Moderate Hyponatremia   - Na of 123 improved to 132-s/p IVF  -likely hypovolemic 2/2 dehydration, pt has had nausea, vomiting, decreased appetite during the last week   -appears dry on exam   s/p 1 L NS   -fu bmp      #Decreased GFR   #Proteinuria  -in setting of dehydration vs age related  -check urine p/c ratio   -monitor renal fxn     #DVT PPx   -subq heparin     Dispo- will treat for UTI  with abx due to underlying UPJ obstruction and stone , symptomatically better ,would consider switching to po abx ceftin in 24hrs for a total course of 7 days, also pending PT eval for discharge planning

## 2024-01-29 NOTE — PHYSICAL THERAPY INITIAL EVALUATION ADULT - MODALITIES TREATMENT COMMENTS
Pt able to ambulate w/ mod A of PT and RW to chair, Pt appears limited in DF ROM at this time limiting balance for gait. Resting in chair and comfortable w/ pillow support. +alarm, CBIR, NAD. Keyla session well.

## 2024-01-29 NOTE — CONSULT NOTE ADULT - NS ATTEND AMEND GEN_ALL_CORE FT
Patient was seen and examined by me, agree with above note, where necessary edits were made.   Discussed CT scan findings and treatment options.  Patient home bound and relies on [facility] for health care.   Requesting further work up be done while she is in Patient.   Renogram ordered for tomorrow.

## 2024-01-29 NOTE — PHYSICAL THERAPY INITIAL EVALUATION ADULT - PERTINENT HX OF CURRENT PROBLEM, REHAB EVAL
89-y/o F, to ED w/  nausea, vomiting  and weakness associated with intermittent chills and decreased appetite x 1 week , patient diagnosed with a UTI outpatient a few days ago and has been taking Bactrim as prescribed for the past 3 days. UTI w/ failed out patient management, Stable Left UPJ obstruction w/ severe dilatation of renal collecting system   PMH: rectal prolapse, arthritis

## 2024-01-29 NOTE — PHYSICAL THERAPY INITIAL EVALUATION ADULT - ACTIVE RANGE OF MOTION EXAMINATION, REHAB EVAL
except B shoulder elevation to 70 deg approx, decreased PROM B shld flexion to 80 deg but painful. B ankles limited into DF PROM to neutral and pt c/o pain w/ this/bilateral upper extremity Active ROM was WFL (within functional limits)/bilateral  lower extremity Active ROM was WFL (within functional limits)

## 2024-01-30 LAB
ANION GAP SERPL CALC-SCNC: 4 MMOL/L — LOW (ref 5–17)
BASOPHILS # BLD AUTO: 0.02 K/UL — SIGNIFICANT CHANGE UP (ref 0–0.2)
BASOPHILS NFR BLD AUTO: 0.5 % — SIGNIFICANT CHANGE UP (ref 0–2)
BUN SERPL-MCNC: 18 MG/DL — SIGNIFICANT CHANGE UP (ref 7–23)
CALCIUM SERPL-MCNC: 9.1 MG/DL — SIGNIFICANT CHANGE UP (ref 8.5–10.1)
CHLORIDE SERPL-SCNC: 108 MMOL/L — SIGNIFICANT CHANGE UP (ref 96–108)
CO2 SERPL-SCNC: 23 MMOL/L — SIGNIFICANT CHANGE UP (ref 22–31)
CREAT SERPL-MCNC: 0.88 MG/DL — SIGNIFICANT CHANGE UP (ref 0.5–1.3)
EGFR: 61 ML/MIN/1.73M2 — SIGNIFICANT CHANGE UP
EOSINOPHIL # BLD AUTO: 0.1 K/UL — SIGNIFICANT CHANGE UP (ref 0–0.5)
EOSINOPHIL NFR BLD AUTO: 2.4 % — SIGNIFICANT CHANGE UP (ref 0–6)
GLUCOSE SERPL-MCNC: 110 MG/DL — HIGH (ref 70–99)
HCT VFR BLD CALC: 32.3 % — LOW (ref 34.5–45)
HGB BLD-MCNC: 10.9 G/DL — LOW (ref 11.5–15.5)
IMM GRANULOCYTES NFR BLD AUTO: 0.7 % — SIGNIFICANT CHANGE UP (ref 0–0.9)
LYMPHOCYTES # BLD AUTO: 1.18 K/UL — SIGNIFICANT CHANGE UP (ref 1–3.3)
LYMPHOCYTES # BLD AUTO: 28 % — SIGNIFICANT CHANGE UP (ref 13–44)
MCHC RBC-ENTMCNC: 31.2 PG — SIGNIFICANT CHANGE UP (ref 27–34)
MCHC RBC-ENTMCNC: 33.7 GM/DL — SIGNIFICANT CHANGE UP (ref 32–36)
MCV RBC AUTO: 92.6 FL — SIGNIFICANT CHANGE UP (ref 80–100)
MONOCYTES # BLD AUTO: 0.56 K/UL — SIGNIFICANT CHANGE UP (ref 0–0.9)
MONOCYTES NFR BLD AUTO: 13.3 % — SIGNIFICANT CHANGE UP (ref 2–14)
NEUTROPHILS # BLD AUTO: 2.32 K/UL — SIGNIFICANT CHANGE UP (ref 1.8–7.4)
NEUTROPHILS NFR BLD AUTO: 55.1 % — SIGNIFICANT CHANGE UP (ref 43–77)
PLATELET # BLD AUTO: 268 K/UL — SIGNIFICANT CHANGE UP (ref 150–400)
POTASSIUM SERPL-MCNC: 4.2 MMOL/L — SIGNIFICANT CHANGE UP (ref 3.5–5.3)
POTASSIUM SERPL-SCNC: 4.2 MMOL/L — SIGNIFICANT CHANGE UP (ref 3.5–5.3)
RBC # BLD: 3.49 M/UL — LOW (ref 3.8–5.2)
RBC # FLD: 14.1 % — SIGNIFICANT CHANGE UP (ref 10.3–14.5)
SODIUM SERPL-SCNC: 135 MMOL/L — SIGNIFICANT CHANGE UP (ref 135–145)
WBC # BLD: 4.21 K/UL — SIGNIFICANT CHANGE UP (ref 3.8–10.5)
WBC # FLD AUTO: 4.21 K/UL — SIGNIFICANT CHANGE UP (ref 3.8–10.5)

## 2024-01-30 PROCEDURE — 99233 SBSQ HOSP IP/OBS HIGH 50: CPT

## 2024-01-30 RX ADMIN — CEFTRIAXONE 1000 MILLIGRAM(S): 500 INJECTION, POWDER, FOR SOLUTION INTRAMUSCULAR; INTRAVENOUS at 22:18

## 2024-01-30 RX ADMIN — HEPARIN SODIUM 5000 UNIT(S): 5000 INJECTION INTRAVENOUS; SUBCUTANEOUS at 22:18

## 2024-01-30 RX ADMIN — HEPARIN SODIUM 5000 UNIT(S): 5000 INJECTION INTRAVENOUS; SUBCUTANEOUS at 12:04

## 2024-01-30 NOTE — PROGRESS NOTE ADULT - ASSESSMENT
89 yo female with PMH as above admitted for UTI. Urology consulted for pt with chronic UPJ obstruction on CT scan. Pt also with pelvic organ prolapse.  Creat wnl. UCx  negative.  Recommend  - Continue antibiotics, adjust as per cultures/ sensitivities (if outpatient UCx available)   - Patient without pain or KAYCEE. No acute emergent intervention required at this time and work up of UPJ obstruction can be done outpatient but pt prefers in house work up - Will obtain Renal scan with Lasix  - Follow up outpatient with Uro Gynecologist for pelvic organ/ bladder prolapse     Case discussed with Dr. Petty

## 2024-01-30 NOTE — PROGRESS NOTE ADULT - NUTRITIONAL ASSESSMENT
This patient has been assessed with a concern for Malnutrition and has been determined to have a diagnosis/diagnoses of Severe protein-calorie malnutrition.    This patient is being managed with:   Diet Regular-  Soft and Bite Sized (SOFTBTSZ)  Supplement Feeding Modality:  Oral  Ensure Plus High Protein Cans or Servings Per Day:  1       Frequency:  Two Times a day  Entered: Jan 28 2024 10:49AM    Diet Regular-  Entered: Jan 27 2024  8:24PM    The following pending diet order is being considered for treatment of Severe protein-calorie malnutrition:null
This patient has been assessed with a concern for Malnutrition and has been determined to have a diagnosis/diagnoses of Severe protein-calorie malnutrition.    This patient is being managed with:   Diet Regular-  Soft and Bite Sized (SOFTBTSZ)  Supplement Feeding Modality:  Oral  Ensure Plus High Protein Cans or Servings Per Day:  1       Frequency:  Two Times a day  Entered: Jan 28 2024 10:49AM    Diet Regular-  Entered: Jan 27 2024  8:24PM    The following pending diet order is being considered for treatment of Severe protein-calorie malnutrition:null

## 2024-01-30 NOTE — PROGRESS NOTE ADULT - SUBJECTIVE AND OBJECTIVE BOX
89-year-old female past medical history rectal prolapse, arthritis who p/w  nausea, vomiting  and weakness associated with intermittent chills and decreased appetite x 1 wee , patient diagnosed with a UTI outpatient a few days ago and has been taking Bactrim as prescribed for the past 3 days.  Patient presents to the ED today due to persistent nausea and difficulty eating.  Denies diarrhea, constipation, vomiting.     In the ED, EKG NSR, received Ceftriaxone. CXR: increased vascularity, spot on L lung, possible nodule?(personally reviewed, fu final read).  1/28 with nausea and decreased po intake     REVIEW OF SYSTEMS:  CONSTITUTIONAL: + weakness, no fevers or chills  EYES/ENT: No visual changes;  No vertigo or throat pain   NECK: No pain or stiffness  RESPIRATORY: No cough, wheezing, hemoptysis; No shortness of breath  CARDIOVASCULAR: No chest pain or palpitations  GASTROINTESTINAL: No abdominal or epigastric pain. + nausea, vomiting; No diarrhea or constipation. No melena or hematochezia.  GENITOURINARY: No dysuria, frequency or hematuria  NEUROLOGICAL: No numbness or weaknes    All other review of systems negative, except as noted in HPI    PHYSICAL EXAM:    Daily Height in cm: 167.64 (27 Jan 2024 14:33)    Daily     ICU Vital Signs Last 24 Hrs  T(C): 36.2 (28 Jan 2024 08:00), Max: 36.5 (27 Jan 2024 18:55)  T(F): 97.2 (28 Jan 2024 08:00), Max: 97.7 (27 Jan 2024 18:55)  HR: 66 (28 Jan 2024 08:00) (66 - 77)  BP: 104/55 (28 Jan 2024 08:00) (104/55 - 151/68)  BP(mean): 83 (27 Jan 2024 18:55) (83 - 83)  ABP: --  ABP(mean): --  RR: 18 (28 Jan 2024 08:00) (17 - 18)  SpO2: 98% (28 Jan 2024 08:00) (97% - 98%)    O2 Parameters below as of 28 Jan 2024 08:00  Patient On (Oxygen Delivery Method): room air  General: WN/WD NAD  Head- Atraumatic, normocephalic   Neurology: A&Ox2, nonfocal,   HEENT- PERRLA, dry  muscous membrane  Neck-supple, no JVD  Respiratory: Air entry equal b/l, CTA   CVS:  S1S2, no murmurs, rubs or gallops  Abdominal: Soft, NT, ND +BS,   Extremities: No edema, + peripheral pulses  Skin- no rash, no ulcer                                      10.2   4.85  )-----------( 231      ( 28 Jan 2024 06:37 )             29.4       CBC Full  -  ( 28 Jan 2024 06:37 )  WBC Count : 4.85 K/uL  RBC Count : 3.25 M/uL  Hemoglobin : 10.2 g/dL  Hematocrit : 29.4 %  Platelet Count - Automated : 231 K/uL  Mean Cell Volume : 90.5 fl  Mean Cell Hemoglobin : 31.4 pg  Mean Cell Hemoglobin Concentration : 34.7 gm/dL  Auto Neutrophil # : 2.36 K/uL  Auto Lymphocyte # : 1.64 K/uL  Auto Monocyte # : 0.79 K/uL  Auto Eosinophil # : 0.03 K/uL  Auto Basophil # : 0.01 K/uL  Auto Neutrophil % : 48.7 %  Auto Lymphocyte % : 33.8 %  Auto Monocyte % : 16.3 %  Auto Eosinophil % : 0.6 %  Auto Basophil % : 0.2 %      01-28    132<L>  |  102  |  13  ----------------------------<  84  3.8   |  24  |  0.92    Ca    8.5      28 Jan 2024 06:37  Mg     2.0     01-27    TPro  6.9  /  Alb  3.1<L>  /  TBili  0.5  /  DBili  x   /  AST  15  /  ALT  24  /  AlkPhos  77  01-27      LIVER FUNCTIONS - ( 27 Jan 2024 14:59 )  Alb: 3.1 g/dL / Pro: 6.9 gm/dL / ALK PHOS: 77 U/L / ALT: 24 U/L / AST: 15 U/L / GGT: x                       Urinalysis Basic - ( 28 Jan 2024 06:37 )    Color: x / Appearance: x / SG: x / pH: x  Gluc: 84 mg/dL / Ketone: x  / Bili: x / Urobili: x   Blood: x / Protein: x / Nitrite: x   Leuk Esterase: x / RBC: x / WBC x   Sq Epi: x / Non Sq Epi: x / Bacteria: x            MEDICATIONS  (STANDING):  cefTRIAXone Injectable. 1000 milliGRAM(s) IV Push every 24 hours  heparin   Injectable 5000 Unit(s) SubCutaneous every 12 hours  sodium chloride 0.9%. 1000 milliLiter(s) (75 mL/Hr) IV Continuous <Continuous>          < from: CT Abdomen and Pelvis w/ IV Cont (01.27.24 @ 15:23) >    INTERPRETATION:  CLINICAL INFORMATION: Nausea, vomiting    COMPARISON: CT abdomen and pelvis 7/14/2021    CONTRAST/COMPLICATIONS:  IV Contrast: Omnipaque 350  90 cc administered   0 cc discarded  Oral Contrast: NONE  Complications: None reported at time of study completion    PROCEDURE:  CT of the Abdomen and Pelvis was performed.  Sagittal and coronal reformats were performed.    FINDINGS:  LOWER CHEST: Clear.    LIVER: Normal.  BILE DUCTS: Nondilated.  GALLBLADDER: Normal.  SPLEEN: Normal.  PANCREAS: Normal.  ADRENALS: Normal.  KIDNEYS/URETERS: Stable appearance of left UPJ obstruction and with   severe dilatation of the left renal collecting system. Several stones in   the left renal collecting system. There is mild wall thickening and   urothelial enhancement within the dilated collecting system.    BLADDER: Cystocele and rest.  REPRODUCTIVE ORGANS: Descent of the pelvic organs.    BOWEL: Moderate hiatal hernia. No bowel obstruction or bowel dilatation.  PERITONEUM: No free air or ascites.  VESSELS: Aortoiliac atherosclerosis without aneurysm.  RETROPERITONEUM/LYMPH NODES: No adenopathy.  ABDOMINAL WALL: Small fat-containing umbilical hernia.  BONES: No acute bony abnormality.    IMPRESSION:  *  No bowel obstruction or bowel inflammation.  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  *  Stable appearance of left UPJ obstruction and with severe dilatation   of the left renal collecting system. There is mild wall thickening and   urothelial enhancement within the dilated collecting system. Superimposed   UTI cannot be excluded.    
Pt seen at bedside, noted to be comfortable, denies any abd/flank pain. Pt seen by Dr Petty yesterday and requesting her UPJ obstruction work up be done in the hospital .    PE  General: No distress, No anxiety  VITALS  T(C): 36.9 (01-30-24 @ 07:51), Max: 36.9 (01-30-24 @ 07:51)  HR: 75 (01-30-24 @ 07:51) (62 - 75)  BP: 122/74 (01-30-24 @ 07:51) (110/72 - 122/74)  RR: 18 (01-30-24 @ 07:51) (16 - 18)  SpO2: 94% (01-30-24 @ 07:51) (94% - 98%)     Lung    : No resp distress  Abdo:   : Soft, Non tender, No guarding, No distension   Back    : No CVAT b/l  Genitalia Female: No Phelps       LABS                        10.9   4.21  )-----------( 268      ( 30 Jan 2024 07:13 )             32.3   01-30    135  |  108  |  18  ----------------------------<  110<H>  4.2   |  23  |  0.88    Ca    9.1      30 Jan 2024 07:13    
89-year-old female past medical history rectal prolapse, arthritis who p/w  nausea, vomiting  and weakness associated with intermittent chills and decreased appetite x 1 wee , patient diagnosed with a UTI outpatient a few days ago and has been taking Bactrim as prescribed for the past 3 days.  Patient presents to the ED today due to persistent nausea and difficulty eating.  Denies diarrhea, constipation, vomiting.     In the ED, EKG NSR, received Ceftriaxone. CXR: increased vascularity, spot on L lung, possible nodule?(personally reviewed, fu final read).  1/28 with nausea and decreased po intake   1/29 no nausea , tolerating po, no abdominal pain, no flank pain, reports feels better     REVIEW OF SYSTEMS:  CONSTITUTIONAL: + weakness, no fevers or chills  EYES/ENT: No visual changes;  No vertigo or throat pain   NECK: No pain or stiffness  RESPIRATORY: No cough, wheezing, hemoptysis; No shortness of breath  CARDIOVASCULAR: No chest pain or palpitations  GASTROINTESTINAL: No abdominal or epigastric pain. + nausea, vomiting; No diarrhea or constipation. No melena or hematochezia.  GENITOURINARY: No dysuria, frequency or hematuria  NEUROLOGICAL: No numbness or weaknes    All other review of systems negative, except as noted in HPI    PHYSICAL EXAM:    Daily     Daily     ICU Vital Signs Last 24 Hrs  T(C): 36.4 (29 Jan 2024 17:24), Max: 36.7 (28 Jan 2024 22:20)  T(F): 97.5 (29 Jan 2024 17:24), Max: 98.1 (28 Jan 2024 22:20)  HR: 62 (29 Jan 2024 17:24) (62 - 73)  BP: 110/72 (29 Jan 2024 17:24) (110/72 - 112/69)  BP(mean): --  ABP: --  ABP(mean): --  RR: 16 (29 Jan 2024 17:24) (16 - 18)  SpO2: 98% (29 Jan 2024 17:24) (97% - 99%)    O2 Parameters below as of 29 Jan 2024 17:24  Patient On (Oxygen Delivery Method): room air  General: WN/WD NAD  Head- Atraumatic, normocephalic   Neurology: A&Ox2, nonfocal,   HEENT- PERRLA, dry  muscous membrane  Neck-supple, no JVD  Respiratory: Air entry equal b/l, CTA   CVS:  S1S2, no murmurs, rubs or gallops  Abdominal: Soft, NT, ND +BS,   Extremities: No edema, + peripheral pulsesSkin- no rash, no ulcer                                  11.1   4.26  )-----------( 237      ( 29 Jan 2024 06:17 )             32.9       CBC Full  -  ( 29 Jan 2024 06:17 )  WBC Count : 4.26 K/uL  RBC Count : 3.53 M/uL  Hemoglobin : 11.1 g/dL  Hematocrit : 32.9 %  Platelet Count - Automated : 237 K/uL  Mean Cell Volume : 93.2 fl  Mean Cell Hemoglobin : 31.4 pg  Mean Cell Hemoglobin Concentration : 33.7 gm/dL  Auto Neutrophil # : 2.24 K/uL  Auto Lymphocyte # : 1.37 K/uL  Auto Monocyte # : 0.55 K/uL  Auto Eosinophil # : 0.05 K/uL  Auto Basophil # : 0.03 K/uL  Auto Neutrophil % : 52.5 %  Auto Lymphocyte % : 32.2 %  Auto Monocyte % : 12.9 %  Auto Eosinophil % : 1.2 %  Auto Basophil % : 0.7 %      01-29    136  |  109<H>  |  14  ----------------------------<  89  4.1   |  21<L>  |  0.91    Ca    8.4<L>      29 Jan 2024 06:17                      Urinalysis Basic - ( 29 Jan 2024 06:17 )    Color: x / Appearance: x / SG: x / pH: x  Gluc: 89 mg/dL / Ketone: x  / Bili: x / Urobili: x   Blood: x / Protein: x / Nitrite: x   Leuk Esterase: x / RBC: x / WBC x   Sq Epi: x / Non Sq Epi: x / Bacteria: x            MEDICATIONS  (STANDING):  cefTRIAXone Injectable. 1000 milliGRAM(s) IV Push every 24 hours  heparin   Injectable 5000 Unit(s) SubCutaneous every 12 hours              < from: CT Abdomen and Pelvis w/ IV Cont (01.27.24 @ 15:23) >    INTERPRETATION:  CLINICAL INFORMATION: Nausea, vomiting    COMPARISON: CT abdomen and pelvis 7/14/2021    CONTRAST/COMPLICATIONS:  IV Contrast: Omnipaque 350  90 cc administered   0 cc discarded  Oral Contrast: NONE  Complications: None reported at time of study completion    PROCEDURE:  CT of the Abdomen and Pelvis was performed.  Sagittal and coronal reformats were performed.    FINDINGS:  LOWER CHEST: Clear.    LIVER: Normal.  BILE DUCTS: Nondilated.  GALLBLADDER: Normal.  SPLEEN: Normal.  PANCREAS: Normal.  ADRENALS: Normal.  KIDNEYS/URETERS: Stable appearance of left UPJ obstruction and with   severe dilatation of the left renal collecting system. Several stones in   the left renal collecting system. There is mild wall thickening and   urothelial enhancement within the dilated collecting system.    BLADDER: Cystocele and rest.  REPRODUCTIVE ORGANS: Descent of the pelvic organs.    BOWEL: Moderate hiatal hernia. No bowel obstruction or bowel dilatation.  PERITONEUM: No free air or ascites.  VESSELS: Aortoiliac atherosclerosis without aneurysm.  RETROPERITONEUM/LYMPH NODES: No adenopathy.  ABDOMINAL WALL: Small fat-containing umbilical hernia.  BONES: No acute bony abnormality.    IMPRESSION:  *  No bowel obstruction or bowel inflammation.  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  *  Stable appearance of left UPJ obstruction and with severe dilatation   of the left renal collecting system. There is mild wall thickening and   urothelial enhancement within the dilated collecting system. Superimposed   UTI cannot be excluded.  
89-year-old female past medical history rectal prolapse, arthritis who p/w  nausea, vomiting  and weakness associated with intermittent chills and decreased appetite x 1 wee , patient diagnosed with a UTI outpatient a few days ago and has been taking Bactrim as prescribed for the past 3 days    1/30/24  Patient seen and examined at bedside. Patient states that she changed her mind and no longer wants to have the nuclear medicine kidney scan.     Vital Signs Last 24 Hrs  T(C): 36.7 (30 Jan 2024 16:23), Max: 36.9 (30 Jan 2024 07:51)  T(F): 98 (30 Jan 2024 16:23), Max: 98.4 (30 Jan 2024 07:51)  HR: 78 (30 Jan 2024 16:23) (73 - 78)  BP: 114/59 (30 Jan 2024 16:23) (114/54 - 122/74)  BP(mean): --  RR: 18 (30 Jan 2024 16:23) (18 - 18)  SpO2: 95% (30 Jan 2024 16:23) (94% - 97%)    Parameters below as of 30 Jan 2024 16:23  Patient On (Oxygen Delivery Method): room air

## 2024-01-30 NOTE — PROGRESS NOTE ADULT - ASSESSMENT
89-year-old female past medical history rectal prolapse, arthritis who p/w  nausea, vomiting  and weakness associated with intermittent chills and decreased appetite x 1 wee , patient diagnosed with a UTI outpatient a few days ago and has been taking Bactrim as prescribed for the past 3 days

## 2024-01-31 ENCOUNTER — TRANSCRIPTION ENCOUNTER (OUTPATIENT)
Age: 89
End: 2024-01-31

## 2024-01-31 VITALS
DIASTOLIC BLOOD PRESSURE: 58 MMHG | TEMPERATURE: 98 F | SYSTOLIC BLOOD PRESSURE: 120 MMHG | RESPIRATION RATE: 18 BRPM | HEART RATE: 70 BPM | OXYGEN SATURATION: 95 %

## 2024-01-31 PROCEDURE — 99239 HOSP IP/OBS DSCHRG MGMT >30: CPT

## 2024-01-31 RX ORDER — CEFUROXIME AXETIL 250 MG
1 TABLET ORAL
Qty: 20 | Refills: 0
Start: 2024-01-31 | End: 2024-02-09

## 2024-01-31 RX ORDER — THIAMINE MONONITRATE (VIT B1) 100 MG
1 TABLET ORAL
Qty: 14 | Refills: 0
Start: 2024-01-31 | End: 2024-02-13

## 2024-01-31 RX ADMIN — HEPARIN SODIUM 5000 UNIT(S): 5000 INJECTION INTRAVENOUS; SUBCUTANEOUS at 11:07

## 2024-01-31 NOTE — DISCHARGE NOTE PROVIDER - NSDCMRMEDTOKEN_GEN_ALL_CORE_FT
cefuroxime 250 mg oral tablet: 1 tab(s) orally 2 times a day  Multiple Vitamins oral tablet: 1 tab(s) orally once a day  thiamine 100 mg oral tablet: 1 tab(s) orally once a day

## 2024-01-31 NOTE — DISCHARGE NOTE PROVIDER - PROVIDER TOKENS
PROVIDER:[TOKEN:[[id number]],FOLLOWUP:[1 week],ESTABLISHEDPATIENT:[T]],PROVIDER:[TOKEN:[[id number]],FOLLOWUP:[1 week]]

## 2024-01-31 NOTE — DISCHARGE NOTE NURSING/CASE MANAGEMENT/SOCIAL WORK - PATIENT PORTAL LINK FT
You can access the FollowMyHealth Patient Portal offered by [facility] by registering at the following website: [email or web address]. By joining [employer]’s FollowMyHealth portal, you will also be able to view your health information using other applications (apps) compatible with our system.

## 2024-01-31 NOTE — DISCHARGE NOTE PROVIDER - CARE PROVIDERS DIRECT ADDRESSES
[email or web address]

## 2024-01-31 NOTE — DISCHARGE NOTE PROVIDER - ATTENDING DISCHARGE PHYSICAL EXAMINATION:
Vital Signs Last 24 Hrs  T(C): 36.6 (31 Jan 2024 15:29), Max: 36.7 (30 Jan 2024 16:23)  T(F): 97.9 (31 Jan 2024 15:29), Max: 98 (30 Jan 2024 16:23)  HR: 70 (31 Jan 2024 15:29) (70 - 78)  BP: 120/58 (31 Jan 2024 15:29) (108/50 - 128/50)  BP(mean): --  RR: 18 (31 Jan 2024 15:29) (18 - 18)  SpO2: 95% (31 Jan 2024 15:29) (94% - 99%)    Review of Systems:   · General Symptoms	fatigue  · ENMT	negative  · Respiratory and Thorax	negative  · Cardiovascular	negative  · Gastrointestinal	negative    Physical Exam:   · Constitutional	cachectic  · Eyes	EOMI  · Respiratory	clear to auscultation bilaterally  · Cardiovascular	S1 S2 present; no murmur  · Gastrointestinal	soft; nontender      Parameters below as of 31 Jan 2024 15:29  Patient On (Oxygen Delivery Method): room air

## 2024-01-31 NOTE — DISCHARGE NOTE PROVIDER - HOSPITAL COURSE
Patient is a 89 y.o. female who presents with nausea, vomiting  and weakness associated with intermittent chills and decreased appetite with PMH rectal prolapse, arthritis admitted with UTI with failure of outpatient treatment of Bactrim and hyponatremia with Stable Left UPJ obstruction w/ severe dilatation of renal collecting system. Patient's aide states that she never took the antibiotics as she came to the ER and did not have outpatient UA or Culture done. Patient treated with IV Rocephin during the hospital course and stated that she did not want to have the nuclear kidney scan done. Patient discharged on oral ceftin x 10 days.     < from: CT Abdomen and Pelvis w/ IV Cont (01.27.24 @ 15:23) >    IMPRESSION:  *  No bowel obstruction or bowel inflammation.  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  *  Stable appearance of left UPJ obstruction and with severe dilatation   of the left renal collecting system. There is mild wall thickening and   urothelial enhancement within the dilated collecting system. Superimposed   UTI cannot be excluded.      --- End of Report ---    < end of copied text >    Culture - Urine (01.27.24 @ 16:46)   Specimen Source: Clean Catch None  Culture Results:   <10,000 CFU/mL Normal Urogenital FloraCulture - Blood (01.27.24 @ 17:28)   Specimen Source: .Blood None  Culture Results:   No growth at 72 Hours

## 2024-01-31 NOTE — DISCHARGE NOTE PROVIDER - CARE PROVIDER_API CALL
Tony Forrest  Internal Medicine  [address] [place] [address]  Phone: [phone]  Fax: [phone]  Established Patient  Follow Up Time: 1 week    Mahsa Mclean  Urogyn and Reconst Pelvic Surg  [address] [place] [address], [facility] [address]  [place] [address]  Phone: [phone]  Fax: [phone]  Follow Up Time: 1 week

## 2024-01-31 NOTE — DISCHARGE NOTE PROVIDER - DETAILS OF MALNUTRITION DIAGNOSIS/DIAGNOSES
This patient has been assessed with a concern for Malnutrition and was treated during this hospitalization for the following Nutrition diagnosis/diagnoses:     -  01/28/2024: Severe protein-calorie malnutrition

## 2024-02-01 LAB
CULTURE RESULTS: SIGNIFICANT CHANGE UP
CULTURE RESULTS: SIGNIFICANT CHANGE UP
SPECIMEN SOURCE: SIGNIFICANT CHANGE UP
SPECIMEN SOURCE: SIGNIFICANT CHANGE UP

## 2024-02-09 DIAGNOSIS — Z90.710 ACQUIRED ABSENCE OF BOTH CERVIX AND UTERUS: ICD-10-CM

## 2024-02-09 DIAGNOSIS — E87.1 HYPO-OSMOLALITY AND HYPONATREMIA: ICD-10-CM

## 2024-02-09 DIAGNOSIS — E43 UNSPECIFIED SEVERE PROTEIN-CALORIE MALNUTRITION: ICD-10-CM

## 2024-02-09 DIAGNOSIS — N13.6 PYONEPHROSIS: ICD-10-CM

## 2024-04-16 ENCOUNTER — NON-APPOINTMENT (OUTPATIENT)
Age: 89
End: 2024-04-16

## 2024-07-08 NOTE — PHYSICAL THERAPY INITIAL EVALUATION ADULT - MANUAL MUSCLE TESTING RESULTS, REHAB EVAL
Detail Level: Zone
Include Location In Plan?: No
Detail Level: Detailed
Detail Level: Simple
Left UE grossly 3-/5, all other extremities grossly 3/5 to 3+/5/grossly assessed due to

## 2024-11-04 NOTE — ED PROVIDER NOTE - CLINICAL SUMMARY MEDICAL DECISION MAKING FREE TEXT BOX
PAIN SCALE 9 OF 10.
Mets Dasi score 6.05 Walks 1 to 2 blocks, climbs 1 flight of stairs, ADLs/4-10 METS
Patient with mechanical fall.  left humerus and olecranon fractures.  Rest of trauma scans negative.  Difficulty ambulating and lives alone.  Severe left hydronephrosis, UPJ obstruction, bladder prolapse.  Suspect chronic as denies flank pain, urine pending.  Admit to medicine.  Appreciate orthopedic consult.  Will need urology consult as inpatient.

## 2024-11-21 NOTE — DISCHARGE NOTE PROVIDER - NSDCCPCAREPLAN_GEN_ALL_CORE_FT
PRINCIPAL DISCHARGE DIAGNOSIS  Diagnosis: Acute UTI  Assessment and Plan of Treatment: ceftin x 10 days      SECONDARY DISCHARGE DIAGNOSES  Diagnosis: Ureteropelvic junction obstruction  Assessment and Plan of Treatment: follow up with Uro-GYN - Dr. Mahsa Diaz    Diagnosis: Hyponatremia  Assessment and Plan of Treatment: improved    
Resident

## 2025-07-20 ENCOUNTER — INPATIENT (INPATIENT)
Facility: HOSPITAL | Age: 89
LOS: 3 days | Discharge: HOSPICE | DRG: 690 | End: 2025-07-24
Attending: STUDENT IN AN ORGANIZED HEALTH CARE EDUCATION/TRAINING PROGRAM | Admitting: STUDENT IN AN ORGANIZED HEALTH CARE EDUCATION/TRAINING PROGRAM
Payer: MEDICARE

## 2025-07-20 VITALS
HEART RATE: 75 BPM | DIASTOLIC BLOOD PRESSURE: 47 MMHG | SYSTOLIC BLOOD PRESSURE: 94 MMHG | OXYGEN SATURATION: 96 % | RESPIRATION RATE: 20 BRPM | TEMPERATURE: 99 F

## 2025-07-20 DIAGNOSIS — Z90.710 ACQUIRED ABSENCE OF BOTH CERVIX AND UTERUS: Chronic | ICD-10-CM

## 2025-07-20 LAB
ALBUMIN SERPL ELPH-MCNC: 3 G/DL — LOW (ref 3.3–5)
ALP SERPL-CCNC: 112 U/L — SIGNIFICANT CHANGE UP (ref 40–120)
ALT FLD-CCNC: 13 U/L — SIGNIFICANT CHANGE UP (ref 12–78)
ANION GAP SERPL CALC-SCNC: 13 MMOL/L — SIGNIFICANT CHANGE UP (ref 5–17)
APPEARANCE UR: ABNORMAL
APTT BLD: 34.7 SEC — SIGNIFICANT CHANGE UP (ref 26.1–36.8)
AST SERPL-CCNC: 16 U/L — SIGNIFICANT CHANGE UP (ref 15–37)
BASOPHILS # BLD AUTO: 0.01 K/UL — SIGNIFICANT CHANGE UP (ref 0–0.2)
BASOPHILS # BLD MANUAL: 0 K/UL — SIGNIFICANT CHANGE UP (ref 0–0.2)
BASOPHILS NFR BLD AUTO: 0.1 % — SIGNIFICANT CHANGE UP (ref 0–2)
BASOPHILS NFR BLD MANUAL: 0 % — SIGNIFICANT CHANGE UP (ref 0–2)
BILIRUB SERPL-MCNC: 0.9 MG/DL — SIGNIFICANT CHANGE UP (ref 0.2–1.2)
BILIRUB UR-MCNC: NEGATIVE — SIGNIFICANT CHANGE UP
BUN SERPL-MCNC: 28 MG/DL — HIGH (ref 7–23)
CALCIUM SERPL-MCNC: 9.2 MG/DL — SIGNIFICANT CHANGE UP (ref 8.5–10.1)
CHLORIDE SERPL-SCNC: 102 MMOL/L — SIGNIFICANT CHANGE UP (ref 96–108)
CO2 SERPL-SCNC: 14 MMOL/L — LOW (ref 22–31)
COLOR SPEC: YELLOW — SIGNIFICANT CHANGE UP
CREAT SERPL-MCNC: 2.24 MG/DL — HIGH (ref 0.5–1.3)
DIFF PNL FLD: ABNORMAL
EGFR: 20 ML/MIN/1.73M2 — LOW
EGFR: 20 ML/MIN/1.73M2 — LOW
EOSINOPHIL # BLD AUTO: 0 K/UL — SIGNIFICANT CHANGE UP (ref 0–0.5)
EOSINOPHIL # BLD MANUAL: 0.07 K/UL — SIGNIFICANT CHANGE UP (ref 0–0.5)
EOSINOPHIL NFR BLD AUTO: 0 % — SIGNIFICANT CHANGE UP (ref 0–6)
EOSINOPHIL NFR BLD MANUAL: 1 % — SIGNIFICANT CHANGE UP (ref 0–6)
GLUCOSE SERPL-MCNC: 277 MG/DL — HIGH (ref 70–99)
GLUCOSE UR QL: 100 MG/DL
HCT VFR BLD CALC: 37.2 % — SIGNIFICANT CHANGE UP (ref 34.5–45)
HGB BLD-MCNC: 12.4 G/DL — SIGNIFICANT CHANGE UP (ref 11.5–15.5)
HYPOSEGMENTATION: PRESENT
IMM GRANULOCYTES # BLD AUTO: 0.03 K/UL — SIGNIFICANT CHANGE UP (ref 0–0.07)
IMM GRANULOCYTES NFR BLD AUTO: 0.4 % — SIGNIFICANT CHANGE UP (ref 0–0.9)
INR BLD: 1.01 RATIO — SIGNIFICANT CHANGE UP (ref 0.85–1.16)
KETONES UR QL: NEGATIVE MG/DL — SIGNIFICANT CHANGE UP
LACTATE SERPL-SCNC: 6.5 MMOL/L — CRITICAL HIGH (ref 0.7–2)
LEUKOCYTE ESTERASE UR-ACNC: ABNORMAL
LYMPHOCYTES # BLD AUTO: 0.29 K/UL — LOW (ref 1–3.3)
LYMPHOCYTES # BLD MANUAL: 0.74 K/UL — LOW (ref 1–3.3)
LYMPHOCYTES NFR BLD AUTO: 3.9 % — LOW (ref 13–44)
LYMPHOCYTES NFR BLD MANUAL: 10 % — LOW (ref 13–44)
MANUAL NEUTROPHIL BANDS #: 1.55 K/UL — HIGH (ref 0–0.84)
MANUAL SMEAR VERIFICATION: SIGNIFICANT CHANGE UP
MCHC RBC-ENTMCNC: 30 PG — SIGNIFICANT CHANGE UP (ref 27–34)
MCHC RBC-ENTMCNC: 33.3 G/DL — SIGNIFICANT CHANGE UP (ref 32–36)
MCV RBC AUTO: 90.1 FL — SIGNIFICANT CHANGE UP (ref 80–100)
MONOCYTES # BLD AUTO: 0.04 K/UL — SIGNIFICANT CHANGE UP (ref 0–0.9)
MONOCYTES # BLD MANUAL: 0 K/UL — SIGNIFICANT CHANGE UP (ref 0–0.9)
MONOCYTES NFR BLD AUTO: 0.5 % — LOW (ref 2–14)
MONOCYTES NFR BLD MANUAL: 0 % — LOW (ref 2–14)
NEUTROPHILS # BLD AUTO: 7 K/UL — SIGNIFICANT CHANGE UP (ref 1.8–7.4)
NEUTROPHILS # BLD MANUAL: 5.01 K/UL — SIGNIFICANT CHANGE UP (ref 1.8–7.4)
NEUTROPHILS NFR BLD AUTO: 95.1 % — HIGH (ref 43–77)
NEUTROPHILS NFR BLD MANUAL: 68 % — SIGNIFICANT CHANGE UP (ref 43–77)
NEUTS BAND # BLD: 21 % — HIGH (ref 0–8)
NEUTS BAND NFR BLD: 21 % — HIGH (ref 0–8)
NITRITE UR-MCNC: NEGATIVE — SIGNIFICANT CHANGE UP
NRBC # BLD AUTO: 0 K/UL — SIGNIFICANT CHANGE UP (ref 0–0)
NRBC # FLD: 0 K/UL — SIGNIFICANT CHANGE UP (ref 0–0)
NRBC BLD AUTO-RTO: 0 /100 WBCS — SIGNIFICANT CHANGE UP (ref 0–0)
PH UR: 7 — SIGNIFICANT CHANGE UP (ref 5–8)
PLAT MORPH BLD: NORMAL — SIGNIFICANT CHANGE UP
PLATELET # BLD AUTO: 213 K/UL — SIGNIFICANT CHANGE UP (ref 150–400)
PLATELET CLUMP BLD QL SMEAR: SLIGHT
PLATELET COUNT - ESTIMATE: NORMAL — SIGNIFICANT CHANGE UP
PMV BLD: 9.1 FL — SIGNIFICANT CHANGE UP (ref 7–13)
POTASSIUM SERPL-MCNC: 4.7 MMOL/L — SIGNIFICANT CHANGE UP (ref 3.5–5.3)
POTASSIUM SERPL-SCNC: 4.7 MMOL/L — SIGNIFICANT CHANGE UP (ref 3.5–5.3)
PROT SERPL-MCNC: 6.9 GM/DL — SIGNIFICANT CHANGE UP (ref 6–8.3)
PROT UR-MCNC: 300 MG/DL
PROTHROM AB SERPL-ACNC: 11.9 SEC — SIGNIFICANT CHANGE UP (ref 9.9–13.4)
RBC # BLD: 4.13 M/UL — SIGNIFICANT CHANGE UP (ref 3.8–5.2)
RBC # FLD: 13.2 % — SIGNIFICANT CHANGE UP (ref 10.3–14.5)
RBC BLD AUTO: ABNORMAL
SMUDGE CELLS # BLD: PRESENT
SODIUM SERPL-SCNC: 129 MMOL/L — LOW (ref 135–145)
SP GR SPEC: 1.01 — SIGNIFICANT CHANGE UP (ref 1–1.03)
STOMATOCYTES BLD QL SMEAR: SLIGHT — SIGNIFICANT CHANGE UP
TROPONIN I, HIGH SENSITIVITY RESULT: 200.7 NG/L — HIGH
UROBILINOGEN FLD QL: 0.2 MG/DL — SIGNIFICANT CHANGE UP (ref 0.2–1)
WBC # BLD: 7.37 K/UL — SIGNIFICANT CHANGE UP (ref 3.8–10.5)
WBC # FLD AUTO: 7.37 K/UL — SIGNIFICANT CHANGE UP (ref 3.8–10.5)
WBC MORPHOLOGY: NORMAL — SIGNIFICANT CHANGE UP

## 2025-07-20 PROCEDURE — 99285 EMERGENCY DEPT VISIT HI MDM: CPT

## 2025-07-20 PROCEDURE — 74177 CT ABD & PELVIS W/CONTRAST: CPT | Mod: 26

## 2025-07-20 PROCEDURE — 70450 CT HEAD/BRAIN W/O DYE: CPT | Mod: 26

## 2025-07-20 PROCEDURE — 71260 CT THORAX DX C+: CPT | Mod: 26

## 2025-07-20 RX ORDER — PIPERACILLIN-TAZO-DEXTROSE,ISO 3.375G/5
3.38 IV SOLUTION, PIGGYBACK PREMIX FROZEN(ML) INTRAVENOUS ONCE
Refills: 0 | Status: COMPLETED | OUTPATIENT
Start: 2025-07-20 | End: 2025-07-20

## 2025-07-20 RX ORDER — ACETAMINOPHEN 500 MG/5ML
1000 LIQUID (ML) ORAL ONCE
Refills: 0 | Status: COMPLETED | OUTPATIENT
Start: 2025-07-20 | End: 2025-07-20

## 2025-07-20 RX ORDER — VANCOMYCIN HCL IN 5 % DEXTROSE 1.5G/250ML
1000 PLASTIC BAG, INJECTION (ML) INTRAVENOUS ONCE
Refills: 0 | Status: COMPLETED | OUTPATIENT
Start: 2025-07-20 | End: 2025-07-20

## 2025-07-20 RX ADMIN — Medication 1000 MILLILITER(S): at 23:26

## 2025-07-20 RX ADMIN — Medication 400 MILLIGRAM(S): at 23:43

## 2025-07-20 RX ADMIN — Medication 200 GRAM(S): at 23:26

## 2025-07-21 DIAGNOSIS — N39.0 URINARY TRACT INFECTION, SITE NOT SPECIFIED: ICD-10-CM

## 2025-07-21 DIAGNOSIS — A41.9 SEPSIS, UNSPECIFIED ORGANISM: ICD-10-CM

## 2025-07-21 DIAGNOSIS — N13.2 HYDRONEPHROSIS WITH RENAL AND URETERAL CALCULOUS OBSTRUCTION: ICD-10-CM

## 2025-07-21 LAB
ANION GAP SERPL CALC-SCNC: 14 MMOL/L — SIGNIFICANT CHANGE UP (ref 5–17)
ANION GAP SERPL CALC-SCNC: 14 MMOL/L — SIGNIFICANT CHANGE UP (ref 5–17)
ANION GAP SERPL CALC-SCNC: 18 MMOL/L — HIGH (ref 5–17)
ANION GAP SERPL CALC-SCNC: 19 MMOL/L — HIGH (ref 5–17)
APTT BLD: 166 SEC — CRITICAL HIGH (ref 26.1–36.8)
APTT BLD: 40.7 SEC — HIGH (ref 26.1–36.8)
APTT BLD: >200 SEC — CRITICAL HIGH (ref 26.1–36.8)
BACTERIA # UR AUTO: ABNORMAL /HPF
BASOPHILS # BLD AUTO: 0.03 K/UL — SIGNIFICANT CHANGE UP (ref 0–0.2)
BASOPHILS # BLD AUTO: 0.05 K/UL — SIGNIFICANT CHANGE UP (ref 0–0.2)
BASOPHILS # BLD MANUAL: 0 K/UL — SIGNIFICANT CHANGE UP (ref 0–0.2)
BASOPHILS NFR BLD AUTO: 0.2 % — SIGNIFICANT CHANGE UP (ref 0–2)
BASOPHILS NFR BLD AUTO: 0.4 % — SIGNIFICANT CHANGE UP (ref 0–2)
BASOPHILS NFR BLD MANUAL: 0 % — SIGNIFICANT CHANGE UP (ref 0–2)
BUN SERPL-MCNC: 27 MG/DL — HIGH (ref 7–23)
BUN SERPL-MCNC: 27 MG/DL — HIGH (ref 7–23)
BUN SERPL-MCNC: 30 MG/DL — HIGH (ref 7–23)
BUN SERPL-MCNC: 32 MG/DL — HIGH (ref 7–23)
CALCIUM SERPL-MCNC: 7.9 MG/DL — LOW (ref 8.5–10.1)
CALCIUM SERPL-MCNC: 7.9 MG/DL — LOW (ref 8.5–10.1)
CALCIUM SERPL-MCNC: 8.1 MG/DL — LOW (ref 8.5–10.1)
CALCIUM SERPL-MCNC: 8.5 MG/DL — SIGNIFICANT CHANGE UP (ref 8.5–10.1)
CAST: SIGNIFICANT CHANGE UP /LPF
CHLORIDE SERPL-SCNC: 100 MMOL/L — SIGNIFICANT CHANGE UP (ref 96–108)
CHLORIDE SERPL-SCNC: 105 MMOL/L — SIGNIFICANT CHANGE UP (ref 96–108)
CHLORIDE SERPL-SCNC: 106 MMOL/L — SIGNIFICANT CHANGE UP (ref 96–108)
CHLORIDE SERPL-SCNC: 106 MMOL/L — SIGNIFICANT CHANGE UP (ref 96–108)
CK SERPL-CCNC: 471 U/L — HIGH (ref 26–192)
CO2 SERPL-SCNC: 11 MMOL/L — LOW (ref 22–31)
CO2 SERPL-SCNC: 13 MMOL/L — LOW (ref 22–31)
CO2 SERPL-SCNC: 16 MMOL/L — LOW (ref 22–31)
CO2 SERPL-SCNC: 9 MMOL/L — CRITICAL LOW (ref 22–31)
COMMENT - URINE: SIGNIFICANT CHANGE UP
CREAT SERPL-MCNC: 2.19 MG/DL — HIGH (ref 0.5–1.3)
CREAT SERPL-MCNC: 2.22 MG/DL — HIGH (ref 0.5–1.3)
CREAT SERPL-MCNC: 2.52 MG/DL — HIGH (ref 0.5–1.3)
CREAT SERPL-MCNC: 3 MG/DL — HIGH (ref 0.5–1.3)
E COLI DNA BLD POS QL NAA+NON-PROBE: SIGNIFICANT CHANGE UP
EGFR: 14 ML/MIN/1.73M2 — LOW
EGFR: 14 ML/MIN/1.73M2 — LOW
EGFR: 17 ML/MIN/1.73M2 — LOW
EGFR: 17 ML/MIN/1.73M2 — LOW
EGFR: 20 ML/MIN/1.73M2 — LOW
EOSINOPHIL # BLD AUTO: 0.02 K/UL — SIGNIFICANT CHANGE UP (ref 0–0.5)
EOSINOPHIL # BLD AUTO: 0.03 K/UL — SIGNIFICANT CHANGE UP (ref 0–0.5)
EOSINOPHIL # BLD MANUAL: 0 K/UL — SIGNIFICANT CHANGE UP (ref 0–0.5)
EOSINOPHIL NFR BLD AUTO: 0.2 % — SIGNIFICANT CHANGE UP (ref 0–6)
EOSINOPHIL NFR BLD AUTO: 0.2 % — SIGNIFICANT CHANGE UP (ref 0–6)
EOSINOPHIL NFR BLD MANUAL: 0 % — SIGNIFICANT CHANGE UP (ref 0–6)
GLUCOSE SERPL-MCNC: 164 MG/DL — HIGH (ref 70–99)
GLUCOSE SERPL-MCNC: 170 MG/DL — HIGH (ref 70–99)
GLUCOSE SERPL-MCNC: 176 MG/DL — HIGH (ref 70–99)
GLUCOSE SERPL-MCNC: 184 MG/DL — HIGH (ref 70–99)
GRAM STN FLD: ABNORMAL
HCT VFR BLD CALC: 35 % — SIGNIFICANT CHANGE UP (ref 34.5–45)
HCT VFR BLD CALC: 37.2 % — SIGNIFICANT CHANGE UP (ref 34.5–45)
HCT VFR BLD CALC: 39.4 % — SIGNIFICANT CHANGE UP (ref 34.5–45)
HGB BLD-MCNC: 11.3 G/DL — LOW (ref 11.5–15.5)
HGB BLD-MCNC: 11.8 G/DL — SIGNIFICANT CHANGE UP (ref 11.5–15.5)
HGB BLD-MCNC: 12.3 G/DL — SIGNIFICANT CHANGE UP (ref 11.5–15.5)
IMM GRANULOCYTES # BLD AUTO: 0.05 K/UL — SIGNIFICANT CHANGE UP (ref 0–0.07)
IMM GRANULOCYTES # BLD AUTO: 0.1 K/UL — HIGH (ref 0–0.07)
IMM GRANULOCYTES NFR BLD AUTO: 0.4 % — SIGNIFICANT CHANGE UP (ref 0–0.9)
IMM GRANULOCYTES NFR BLD AUTO: 0.6 % — SIGNIFICANT CHANGE UP (ref 0–0.9)
INR BLD: 1.3 RATIO — HIGH (ref 0.85–1.16)
LACTATE SERPL-SCNC: 8 MMOL/L — CRITICAL HIGH (ref 0.7–2)
LACTATE SERPL-SCNC: 8.6 MMOL/L — CRITICAL HIGH (ref 0.7–2)
LACTATE SERPL-SCNC: 9.7 MMOL/L — CRITICAL HIGH (ref 0.7–2)
LACTATE SERPL-SCNC: 9.7 MMOL/L — CRITICAL HIGH (ref 0.7–2)
LYMPHOCYTES # BLD AUTO: 0.74 K/UL — LOW (ref 1–3.3)
LYMPHOCYTES # BLD AUTO: 0.8 K/UL — LOW (ref 1–3.3)
LYMPHOCYTES # BLD MANUAL: 1.48 K/UL — SIGNIFICANT CHANGE UP (ref 1–3.3)
LYMPHOCYTES NFR BLD AUTO: 5.2 % — LOW (ref 13–44)
LYMPHOCYTES NFR BLD AUTO: 6 % — LOW (ref 13–44)
LYMPHOCYTES NFR BLD MANUAL: 12 % — LOW (ref 13–44)
MACROCYTES BLD QL: SLIGHT — SIGNIFICANT CHANGE UP
MAGNESIUM SERPL-MCNC: 1.4 MG/DL — LOW (ref 1.6–2.6)
MAGNESIUM SERPL-MCNC: 1.8 MG/DL — SIGNIFICANT CHANGE UP (ref 1.6–2.6)
MAGNESIUM SERPL-MCNC: 1.9 MG/DL — SIGNIFICANT CHANGE UP (ref 1.6–2.6)
MANUAL NEUTROPHIL BANDS #: 2.1 K/UL — HIGH (ref 0–0.84)
MANUAL SMEAR VERIFICATION: SIGNIFICANT CHANGE UP
MCHC RBC-ENTMCNC: 30 PG — SIGNIFICANT CHANGE UP (ref 27–34)
MCHC RBC-ENTMCNC: 30.2 PG — SIGNIFICANT CHANGE UP (ref 27–34)
MCHC RBC-ENTMCNC: 30.3 PG — SIGNIFICANT CHANGE UP (ref 27–34)
MCHC RBC-ENTMCNC: 31.2 G/DL — LOW (ref 32–36)
MCHC RBC-ENTMCNC: 31.7 G/DL — LOW (ref 32–36)
MCHC RBC-ENTMCNC: 32.3 G/DL — SIGNIFICANT CHANGE UP (ref 32–36)
MCV RBC AUTO: 93.6 FL — SIGNIFICANT CHANGE UP (ref 80–100)
MCV RBC AUTO: 95.6 FL — SIGNIFICANT CHANGE UP (ref 80–100)
MCV RBC AUTO: 96.1 FL — SIGNIFICANT CHANGE UP (ref 80–100)
METHOD TYPE: SIGNIFICANT CHANGE UP
MONOCYTES # BLD AUTO: 0.11 K/UL — SIGNIFICANT CHANGE UP (ref 0–0.9)
MONOCYTES # BLD AUTO: 0.13 K/UL — SIGNIFICANT CHANGE UP (ref 0–0.9)
MONOCYTES # BLD MANUAL: 0 K/UL — SIGNIFICANT CHANGE UP (ref 0–0.9)
MONOCYTES NFR BLD AUTO: 0.8 % — LOW (ref 2–14)
MONOCYTES NFR BLD AUTO: 0.9 % — LOW (ref 2–14)
MONOCYTES NFR BLD MANUAL: 0 % — LOW (ref 2–14)
MRSA PCR RESULT.: SIGNIFICANT CHANGE UP
NEUTROPHILS # BLD AUTO: 11.4 K/UL — HIGH (ref 1.8–7.4)
NEUTROPHILS # BLD AUTO: 14.32 K/UL — HIGH (ref 1.8–7.4)
NEUTROPHILS # BLD MANUAL: 8.78 K/UL — HIGH (ref 1.8–7.4)
NEUTROPHILS NFR BLD AUTO: 92.1 % — HIGH (ref 43–77)
NEUTROPHILS NFR BLD AUTO: 93 % — HIGH (ref 43–77)
NEUTROPHILS NFR BLD MANUAL: 71 % — SIGNIFICANT CHANGE UP (ref 43–77)
NEUTS BAND # BLD: 17 % — HIGH (ref 0–8)
NEUTS BAND NFR BLD: 17 % — HIGH (ref 0–8)
NRBC # BLD AUTO: 0 K/UL — SIGNIFICANT CHANGE UP (ref 0–0)
NRBC # BLD AUTO: 0 K/UL — SIGNIFICANT CHANGE UP (ref 0–0)
NRBC # BLD AUTO: 0.03 K/UL — HIGH (ref 0–0)
NRBC # FLD: 0 K/UL — SIGNIFICANT CHANGE UP (ref 0–0)
NRBC # FLD: 0 K/UL — SIGNIFICANT CHANGE UP (ref 0–0)
NRBC # FLD: 0.03 K/UL — HIGH (ref 0–0)
NRBC BLD AUTO-RTO: 0 /100 WBCS — SIGNIFICANT CHANGE UP (ref 0–0)
OVALOCYTES BLD QL SMEAR: SLIGHT — SIGNIFICANT CHANGE UP
PHOSPHATE SERPL-MCNC: 2.2 MG/DL — LOW (ref 2.5–4.5)
PHOSPHATE SERPL-MCNC: 3.4 MG/DL — SIGNIFICANT CHANGE UP (ref 2.5–4.5)
PHOSPHATE SERPL-MCNC: 3.7 MG/DL — SIGNIFICANT CHANGE UP (ref 2.5–4.5)
PLAT MORPH BLD: NORMAL — SIGNIFICANT CHANGE UP
PLATELET # BLD AUTO: 136 K/UL — LOW (ref 150–400)
PLATELET # BLD AUTO: 171 K/UL — SIGNIFICANT CHANGE UP (ref 150–400)
PLATELET # BLD AUTO: 174 K/UL — SIGNIFICANT CHANGE UP (ref 150–400)
PMV BLD: 10.3 FL — SIGNIFICANT CHANGE UP (ref 7–13)
PMV BLD: 9.3 FL — SIGNIFICANT CHANGE UP (ref 7–13)
PMV BLD: 9.7 FL — SIGNIFICANT CHANGE UP (ref 7–13)
POLYCHROMASIA BLD QL SMEAR: SLIGHT — SIGNIFICANT CHANGE UP
POTASSIUM SERPL-MCNC: 4.1 MMOL/L — SIGNIFICANT CHANGE UP (ref 3.5–5.3)
POTASSIUM SERPL-MCNC: 4.2 MMOL/L — SIGNIFICANT CHANGE UP (ref 3.5–5.3)
POTASSIUM SERPL-MCNC: 4.3 MMOL/L — SIGNIFICANT CHANGE UP (ref 3.5–5.3)
POTASSIUM SERPL-MCNC: 5 MMOL/L — SIGNIFICANT CHANGE UP (ref 3.5–5.3)
POTASSIUM SERPL-SCNC: 4.1 MMOL/L — SIGNIFICANT CHANGE UP (ref 3.5–5.3)
POTASSIUM SERPL-SCNC: 4.2 MMOL/L — SIGNIFICANT CHANGE UP (ref 3.5–5.3)
POTASSIUM SERPL-SCNC: 4.3 MMOL/L — SIGNIFICANT CHANGE UP (ref 3.5–5.3)
POTASSIUM SERPL-SCNC: 5 MMOL/L — SIGNIFICANT CHANGE UP (ref 3.5–5.3)
PROTHROM AB SERPL-ACNC: 14.9 SEC — HIGH (ref 9.9–13.4)
RBC # BLD: 3.74 M/UL — LOW (ref 3.8–5.2)
RBC # BLD: 3.89 M/UL — SIGNIFICANT CHANGE UP (ref 3.8–5.2)
RBC # BLD: 4.1 M/UL — SIGNIFICANT CHANGE UP (ref 3.8–5.2)
RBC # FLD: 13.7 % — SIGNIFICANT CHANGE UP (ref 10.3–14.5)
RBC # FLD: 14 % — SIGNIFICANT CHANGE UP (ref 10.3–14.5)
RBC # FLD: 14.1 % — SIGNIFICANT CHANGE UP (ref 10.3–14.5)
RBC BLD AUTO: ABNORMAL
RBC CASTS # UR COMP ASSIST: 22 /HPF — HIGH (ref 0–4)
S AUREUS DNA NOSE QL NAA+PROBE: SIGNIFICANT CHANGE UP
SODIUM SERPL-SCNC: 130 MMOL/L — LOW (ref 135–145)
SODIUM SERPL-SCNC: 132 MMOL/L — LOW (ref 135–145)
SODIUM SERPL-SCNC: 133 MMOL/L — LOW (ref 135–145)
SODIUM SERPL-SCNC: 136 MMOL/L — SIGNIFICANT CHANGE UP (ref 135–145)
SPECIMEN SOURCE: SIGNIFICANT CHANGE UP
SPECIMEN SOURCE: SIGNIFICANT CHANGE UP
SQUAMOUS # UR AUTO: 0 /HPF — SIGNIFICANT CHANGE UP (ref 0–5)
TROPONIN I, HIGH SENSITIVITY RESULT: 1318.98 NG/L — HIGH
TROPONIN I, HIGH SENSITIVITY RESULT: 1867.22 NG/L — HIGH
TROPONIN I, HIGH SENSITIVITY RESULT: 2086.58 NG/L — HIGH
WBC # BLD: 12.37 K/UL — HIGH (ref 3.8–10.5)
WBC # BLD: 15.41 K/UL — HIGH (ref 3.8–10.5)
WBC # BLD: 28.78 K/UL — HIGH (ref 3.8–10.5)
WBC # FLD AUTO: 12.37 K/UL — HIGH (ref 3.8–10.5)
WBC # FLD AUTO: 15.41 K/UL — HIGH (ref 3.8–10.5)
WBC # FLD AUTO: 28.78 K/UL — HIGH (ref 3.8–10.5)
WBC MORPHOLOGY: NORMAL — SIGNIFICANT CHANGE UP
WBC UR QL: 704 /HPF — HIGH (ref 0–5)

## 2025-07-21 PROCEDURE — 99222 1ST HOSP IP/OBS MODERATE 55: CPT

## 2025-07-21 PROCEDURE — 84484 ASSAY OF TROPONIN QUANT: CPT

## 2025-07-21 PROCEDURE — 83605 ASSAY OF LACTIC ACID: CPT

## 2025-07-21 PROCEDURE — 85610 PROTHROMBIN TIME: CPT

## 2025-07-21 PROCEDURE — 99291 CRITICAL CARE FIRST HOUR: CPT

## 2025-07-21 PROCEDURE — 84100 ASSAY OF PHOSPHORUS: CPT

## 2025-07-21 PROCEDURE — 87641 MR-STAPH DNA AMP PROBE: CPT

## 2025-07-21 PROCEDURE — 85025 COMPLETE CBC W/AUTO DIFF WBC: CPT

## 2025-07-21 PROCEDURE — 83735 ASSAY OF MAGNESIUM: CPT

## 2025-07-21 PROCEDURE — 99223 1ST HOSP IP/OBS HIGH 75: CPT

## 2025-07-21 PROCEDURE — 87640 STAPH A DNA AMP PROBE: CPT

## 2025-07-21 PROCEDURE — 99223 1ST HOSP IP/OBS HIGH 75: CPT | Mod: 25

## 2025-07-21 PROCEDURE — 82550 ASSAY OF CK (CPK): CPT

## 2025-07-21 PROCEDURE — 36415 COLL VENOUS BLD VENIPUNCTURE: CPT

## 2025-07-21 PROCEDURE — 85730 THROMBOPLASTIN TIME PARTIAL: CPT

## 2025-07-21 PROCEDURE — 85027 COMPLETE CBC AUTOMATED: CPT

## 2025-07-21 PROCEDURE — 80048 BASIC METABOLIC PNL TOTAL CA: CPT

## 2025-07-21 PROCEDURE — 93010 ELECTROCARDIOGRAM REPORT: CPT

## 2025-07-21 PROCEDURE — 80053 COMPREHEN METABOLIC PANEL: CPT

## 2025-07-21 RX ORDER — MAGNESIUM SULFATE 500 MG/ML
1 SYRINGE (ML) INJECTION ONCE
Refills: 0 | Status: COMPLETED | OUTPATIENT
Start: 2025-07-21 | End: 2025-07-21

## 2025-07-21 RX ORDER — SODIUM BICARBONATE 1 MEQ/ML
50 SYRINGE (ML) INTRAVENOUS ONCE
Refills: 0 | Status: COMPLETED | OUTPATIENT
Start: 2025-07-21 | End: 2025-07-21

## 2025-07-21 RX ORDER — HEPARIN SODIUM 1000 [USP'U]/ML
3500 INJECTION INTRAVENOUS; SUBCUTANEOUS EVERY 6 HOURS
Refills: 0 | Status: DISCONTINUED | OUTPATIENT
Start: 2025-07-21 | End: 2025-07-22

## 2025-07-21 RX ORDER — NOREPINEPHRINE BITARTRATE 8 MG
0.05 SOLUTION INTRAVENOUS
Qty: 8 | Refills: 0 | Status: DISCONTINUED | OUTPATIENT
Start: 2025-07-21 | End: 2025-07-21

## 2025-07-21 RX ORDER — MIDODRINE HYDROCHLORIDE 5 MG/1
10 TABLET ORAL EVERY 8 HOURS
Refills: 0 | Status: DISCONTINUED | OUTPATIENT
Start: 2025-07-21 | End: 2025-07-22

## 2025-07-21 RX ORDER — B1/B2/B3/B5/B6/B12/VIT C/FOLIC 500-0.5 MG
1 TABLET ORAL DAILY
Refills: 0 | Status: DISCONTINUED | OUTPATIENT
Start: 2025-07-21 | End: 2025-07-22

## 2025-07-21 RX ORDER — SODIUM BICARBONATE 1 MEQ/ML
0.26 SYRINGE (ML) INTRAVENOUS
Qty: 150 | Refills: 0 | Status: DISCONTINUED | OUTPATIENT
Start: 2025-07-21 | End: 2025-07-22

## 2025-07-21 RX ORDER — PIPERACILLIN-TAZO-DEXTROSE,ISO 3.375G/5
3.38 IV SOLUTION, PIGGYBACK PREMIX FROZEN(ML) INTRAVENOUS EVERY 12 HOURS
Refills: 0 | Status: DISCONTINUED | OUTPATIENT
Start: 2025-07-21 | End: 2025-07-22

## 2025-07-21 RX ORDER — MIDODRINE HYDROCHLORIDE 5 MG/1
10 TABLET ORAL ONCE
Refills: 0 | Status: COMPLETED | OUTPATIENT
Start: 2025-07-21 | End: 2025-07-21

## 2025-07-21 RX ORDER — HEPARIN SODIUM 1000 [USP'U]/ML
INJECTION INTRAVENOUS; SUBCUTANEOUS
Qty: 25000 | Refills: 0 | Status: DISCONTINUED | OUTPATIENT
Start: 2025-07-21 | End: 2025-07-22

## 2025-07-21 RX ORDER — POTASSIUM PHOSPHATE, MONOBASIC POTASSIUM PHOSPHATE, DIBASIC INJECTION, 236; 224 MG/ML; MG/ML
15 SOLUTION, CONCENTRATE INTRAVENOUS ONCE
Refills: 0 | Status: COMPLETED | OUTPATIENT
Start: 2025-07-21 | End: 2025-07-21

## 2025-07-21 RX ORDER — NOREPINEPHRINE BITARTRATE 8 MG
0.05 SOLUTION INTRAVENOUS
Qty: 8 | Refills: 0 | Status: DISCONTINUED | OUTPATIENT
Start: 2025-07-21 | End: 2025-07-22

## 2025-07-21 RX ORDER — ACETAMINOPHEN 500 MG/5ML
650 LIQUID (ML) ORAL EVERY 6 HOURS
Refills: 0 | Status: DISCONTINUED | OUTPATIENT
Start: 2025-07-21 | End: 2025-07-22

## 2025-07-21 RX ADMIN — HEPARIN SODIUM 0 UNIT(S)/HR: 1000 INJECTION INTRAVENOUS; SUBCUTANEOUS at 12:29

## 2025-07-21 RX ADMIN — Medication 100 MILLIGRAM(S): at 12:40

## 2025-07-21 RX ADMIN — Medication 50 MILLIEQUIVALENT(S): at 09:55

## 2025-07-21 RX ADMIN — Medication 1000 MILLILITER(S): at 01:05

## 2025-07-21 RX ADMIN — Medication 500 MILLILITER(S): at 03:41

## 2025-07-21 RX ADMIN — Medication 75 MILLILITER(S): at 03:49

## 2025-07-21 RX ADMIN — Medication 78.5 MEQ/KG/HR: at 07:54

## 2025-07-21 RX ADMIN — NOREPINEPHRINE BITARTRATE 5.52 MICROGRAM(S)/KG/MIN: 8 SOLUTION at 10:07

## 2025-07-21 RX ADMIN — HEPARIN SODIUM 300 UNIT(S)/HR: 1000 INJECTION INTRAVENOUS; SUBCUTANEOUS at 22:10

## 2025-07-21 RX ADMIN — Medication 100 GRAM(S): at 13:49

## 2025-07-21 RX ADMIN — Medication 1 APPLICATION(S): at 05:45

## 2025-07-21 RX ADMIN — HEPARIN SODIUM 500 UNIT(S)/HR: 1000 INJECTION INTRAVENOUS; SUBCUTANEOUS at 13:43

## 2025-07-21 RX ADMIN — Medication 1 TABLET(S): at 12:41

## 2025-07-21 RX ADMIN — Medication 1000 MILLILITER(S): at 00:30

## 2025-07-21 RX ADMIN — MIDODRINE HYDROCHLORIDE 10 MILLIGRAM(S): 5 TABLET ORAL at 13:14

## 2025-07-21 RX ADMIN — HEPARIN SODIUM 500 UNIT(S)/HR: 1000 INJECTION INTRAVENOUS; SUBCUTANEOUS at 19:40

## 2025-07-21 RX ADMIN — Medication 1000 MILLIGRAM(S): at 01:37

## 2025-07-21 RX ADMIN — NOREPINEPHRINE BITARTRATE 5.52 MICROGRAM(S)/KG/MIN: 8 SOLUTION at 23:06

## 2025-07-21 RX ADMIN — Medication 1000 MILLILITER(S): at 00:41

## 2025-07-21 RX ADMIN — MIDODRINE HYDROCHLORIDE 10 MILLIGRAM(S): 5 TABLET ORAL at 22:00

## 2025-07-21 RX ADMIN — Medication 25 GRAM(S): at 23:03

## 2025-07-21 RX ADMIN — Medication 25 GRAM(S): at 11:18

## 2025-07-21 RX ADMIN — HEPARIN SODIUM 700 UNIT(S)/HR: 1000 INJECTION INTRAVENOUS; SUBCUTANEOUS at 05:41

## 2025-07-21 RX ADMIN — NOREPINEPHRINE BITARTRATE 5.52 MICROGRAM(S)/KG/MIN: 8 SOLUTION at 17:14

## 2025-07-21 RX ADMIN — Medication 1000 MILLILITER(S): at 01:50

## 2025-07-21 RX ADMIN — NOREPINEPHRINE BITARTRATE 5.16 MICROGRAM(S)/KG/MIN: 8 SOLUTION at 01:42

## 2025-07-21 RX ADMIN — POTASSIUM PHOSPHATE, MONOBASIC POTASSIUM PHOSPHATE, DIBASIC INJECTION, 62.5 MILLIMOLE(S): 236; 224 SOLUTION, CONCENTRATE INTRAVENOUS at 06:47

## 2025-07-21 RX ADMIN — Medication 100 MEQ/KG/HR: at 09:56

## 2025-07-21 RX ADMIN — Medication 50 MILLIEQUIVALENT(S): at 06:26

## 2025-07-21 RX ADMIN — Medication 250 MILLIGRAM(S): at 00:45

## 2025-07-21 RX ADMIN — HEPARIN SODIUM 0 UNIT(S)/HR: 1000 INJECTION INTRAVENOUS; SUBCUTANEOUS at 21:03

## 2025-07-21 RX ADMIN — Medication 10 MILLIGRAM(S): at 09:54

## 2025-07-21 RX ADMIN — Medication 100 MEQ/KG/HR: at 19:46

## 2025-07-21 RX ADMIN — Medication 3.38 GRAM(S): at 00:00

## 2025-07-21 RX ADMIN — Medication 650 MILLIGRAM(S): at 18:30

## 2025-07-22 LAB
ALBUMIN SERPL ELPH-MCNC: 2.1 G/DL — LOW (ref 3.3–5)
ALP SERPL-CCNC: 82 U/L — SIGNIFICANT CHANGE UP (ref 40–120)
ALT FLD-CCNC: 22 U/L — SIGNIFICANT CHANGE UP (ref 12–78)
ANION GAP SERPL CALC-SCNC: 16 MMOL/L — SIGNIFICANT CHANGE UP (ref 5–17)
APTT BLD: 158.2 SEC — CRITICAL HIGH (ref 26.1–36.8)
AST SERPL-CCNC: 42 U/L — HIGH (ref 15–37)
BILIRUB SERPL-MCNC: 0.6 MG/DL — SIGNIFICANT CHANGE UP (ref 0.2–1.2)
BUN SERPL-MCNC: 34 MG/DL — HIGH (ref 7–23)
CALCIUM SERPL-MCNC: 7.5 MG/DL — LOW (ref 8.5–10.1)
CHLORIDE SERPL-SCNC: 99 MMOL/L — SIGNIFICANT CHANGE UP (ref 96–108)
CO2 SERPL-SCNC: 18 MMOL/L — LOW (ref 22–31)
CREAT SERPL-MCNC: 3.4 MG/DL — HIGH (ref 0.5–1.3)
EGFR: 12 ML/MIN/1.73M2 — LOW
EGFR: 12 ML/MIN/1.73M2 — LOW
GLUCOSE SERPL-MCNC: 104 MG/DL — HIGH (ref 70–99)
HCT VFR BLD CALC: 33.8 % — LOW (ref 34.5–45)
HGB BLD-MCNC: 11.4 G/DL — LOW (ref 11.5–15.5)
IMMATURE PLATELET FRACTION #: 2.1 K/UL — LOW (ref 4.7–11.1)
IMMATURE PLATELET FRACTION %: 3.1 % — SIGNIFICANT CHANGE UP (ref 1.6–4.9)
LACTATE SERPL-SCNC: 8.4 MMOL/L — CRITICAL HIGH (ref 0.7–2)
MAGNESIUM SERPL-MCNC: 1.9 MG/DL — SIGNIFICANT CHANGE UP (ref 1.6–2.6)
MANUAL SMEAR VERIFICATION: SIGNIFICANT CHANGE UP
MCHC RBC-ENTMCNC: 30.1 PG — SIGNIFICANT CHANGE UP (ref 27–34)
MCHC RBC-ENTMCNC: 33.7 G/DL — SIGNIFICANT CHANGE UP (ref 32–36)
MCV RBC AUTO: 89.2 FL — SIGNIFICANT CHANGE UP (ref 80–100)
NRBC # BLD AUTO: 0 K/UL — SIGNIFICANT CHANGE UP (ref 0–0)
NRBC # FLD: 0 K/UL — SIGNIFICANT CHANGE UP (ref 0–0)
NRBC BLD AUTO-RTO: 0 /100 WBCS — SIGNIFICANT CHANGE UP (ref 0–0)
PHOSPHATE SERPL-MCNC: 3.8 MG/DL — SIGNIFICANT CHANGE UP (ref 2.5–4.5)
PLAT MORPH BLD: NORMAL — SIGNIFICANT CHANGE UP
PLATELET # BLD AUTO: 69 K/UL — LOW (ref 150–400)
PMV BLD: 10.5 FL — SIGNIFICANT CHANGE UP (ref 7–13)
POTASSIUM SERPL-MCNC: 4.5 MMOL/L — SIGNIFICANT CHANGE UP (ref 3.5–5.3)
POTASSIUM SERPL-SCNC: 4.5 MMOL/L — SIGNIFICANT CHANGE UP (ref 3.5–5.3)
PROT SERPL-MCNC: 5.7 GM/DL — LOW (ref 6–8.3)
RBC # BLD: 3.79 M/UL — LOW (ref 3.8–5.2)
RBC # FLD: 14.5 % — SIGNIFICANT CHANGE UP (ref 10.3–14.5)
RBC BLD AUTO: NORMAL — SIGNIFICANT CHANGE UP
SODIUM SERPL-SCNC: 133 MMOL/L — LOW (ref 135–145)
TROPONIN I, HIGH SENSITIVITY RESULT: 1762.88 NG/L — HIGH
WBC # BLD: 27.78 K/UL — HIGH (ref 3.8–10.5)
WBC # FLD AUTO: 27.78 K/UL — HIGH (ref 3.8–10.5)
WBC MORPHOLOGY: NORMAL — SIGNIFICANT CHANGE UP

## 2025-07-22 PROCEDURE — 99233 SBSQ HOSP IP/OBS HIGH 50: CPT

## 2025-07-22 PROCEDURE — 99232 SBSQ HOSP IP/OBS MODERATE 35: CPT

## 2025-07-22 RX ORDER — HYDROMORPHONE/SOD CHLOR,ISO/PF 2 MG/10 ML
0.5 SYRINGE (ML) INJECTION
Refills: 0 | Status: DISCONTINUED | OUTPATIENT
Start: 2025-07-22 | End: 2025-07-24

## 2025-07-22 RX ORDER — GLYCOPYRROLATE 0.2 MG/ML
0.4 INJECTION INTRAMUSCULAR; INTRAVENOUS
Refills: 0 | Status: DISCONTINUED | OUTPATIENT
Start: 2025-07-22 | End: 2025-07-24

## 2025-07-22 RX ORDER — LORAZEPAM 4 MG/ML
0.5 VIAL (ML) INJECTION EVERY 4 HOURS
Refills: 0 | Status: DISCONTINUED | OUTPATIENT
Start: 2025-07-22 | End: 2025-07-24

## 2025-07-22 RX ADMIN — Medication 0.5 MILLIGRAM(S): at 11:18

## 2025-07-22 RX ADMIN — Medication 100 MILLIGRAM(S): at 09:26

## 2025-07-22 RX ADMIN — Medication 1 APPLICATION(S): at 06:09

## 2025-07-22 RX ADMIN — Medication 100 MEQ/KG/HR: at 07:15

## 2025-07-22 RX ADMIN — Medication 0.5 MILLIGRAM(S): at 10:48

## 2025-07-22 RX ADMIN — Medication 10 MILLIGRAM(S): at 09:27

## 2025-07-22 RX ADMIN — MIDODRINE HYDROCHLORIDE 10 MILLIGRAM(S): 5 TABLET ORAL at 05:25

## 2025-07-22 RX ADMIN — Medication 1 TABLET(S): at 09:26

## 2025-07-22 RX ADMIN — HEPARIN SODIUM 100 UNIT(S)/HR: 1000 INJECTION INTRAVENOUS; SUBCUTANEOUS at 05:51

## 2025-07-22 RX ADMIN — HEPARIN SODIUM 0 UNIT(S)/HR: 1000 INJECTION INTRAVENOUS; SUBCUTANEOUS at 04:49

## 2025-07-22 RX ADMIN — NOREPINEPHRINE BITARTRATE 5.52 MICROGRAM(S)/KG/MIN: 8 SOLUTION at 05:25

## 2025-07-23 LAB
-  AMOXICILLIN/CLAVULANIC ACID: SIGNIFICANT CHANGE UP
-  AMPICILLIN/SULBACTAM: SIGNIFICANT CHANGE UP
-  AMPICILLIN/SULBACTAM: SIGNIFICANT CHANGE UP
-  AMPICILLIN: SIGNIFICANT CHANGE UP
-  AMPICILLIN: SIGNIFICANT CHANGE UP
-  AZTREONAM: SIGNIFICANT CHANGE UP
-  AZTREONAM: SIGNIFICANT CHANGE UP
-  CEFAZOLIN: SIGNIFICANT CHANGE UP
-  CEFAZOLIN: SIGNIFICANT CHANGE UP
-  CEFEPIME: SIGNIFICANT CHANGE UP
-  CEFEPIME: SIGNIFICANT CHANGE UP
-  CEFOXITIN: SIGNIFICANT CHANGE UP
-  CEFOXITIN: SIGNIFICANT CHANGE UP
-  CEFTRIAXONE: SIGNIFICANT CHANGE UP
-  CEFTRIAXONE: SIGNIFICANT CHANGE UP
-  CEFUROXIME: SIGNIFICANT CHANGE UP
-  CIPROFLOXACIN: SIGNIFICANT CHANGE UP
-  CIPROFLOXACIN: SIGNIFICANT CHANGE UP
-  ERTAPENEM: SIGNIFICANT CHANGE UP
-  ERTAPENEM: SIGNIFICANT CHANGE UP
-  GENTAMICIN: SIGNIFICANT CHANGE UP
-  GENTAMICIN: SIGNIFICANT CHANGE UP
-  IMIPENEM: SIGNIFICANT CHANGE UP
-  IMIPENEM: SIGNIFICANT CHANGE UP
-  LEVOFLOXACIN: SIGNIFICANT CHANGE UP
-  LEVOFLOXACIN: SIGNIFICANT CHANGE UP
-  MEROPENEM: SIGNIFICANT CHANGE UP
-  MEROPENEM: SIGNIFICANT CHANGE UP
-  NITROFURANTOIN: SIGNIFICANT CHANGE UP
-  PIPERACILLIN/TAZOBACTAM: SIGNIFICANT CHANGE UP
-  PIPERACILLIN/TAZOBACTAM: SIGNIFICANT CHANGE UP
-  TIGECYCLINE: SIGNIFICANT CHANGE UP
-  TIGECYCLINE: SIGNIFICANT CHANGE UP
-  TOBRAMYCIN: SIGNIFICANT CHANGE UP
-  TOBRAMYCIN: SIGNIFICANT CHANGE UP
-  TRIMETHOPRIM/SULFAMETHOXAZOLE: SIGNIFICANT CHANGE UP
-  TRIMETHOPRIM/SULFAMETHOXAZOLE: SIGNIFICANT CHANGE UP
CULTURE RESULTS: ABNORMAL
METHOD TYPE: SIGNIFICANT CHANGE UP
METHOD TYPE: SIGNIFICANT CHANGE UP
ORGANISM # SPEC MICROSCOPIC CNT: ABNORMAL
ORGANISM # SPEC MICROSCOPIC CNT: SIGNIFICANT CHANGE UP
ORGANISM # SPEC MICROSCOPIC CNT: SIGNIFICANT CHANGE UP
SPECIMEN SOURCE: SIGNIFICANT CHANGE UP

## 2025-07-23 PROCEDURE — 99232 SBSQ HOSP IP/OBS MODERATE 35: CPT

## 2025-07-23 RX ADMIN — Medication 0.5 MILLIGRAM(S): at 23:04

## 2025-07-23 RX ADMIN — Medication 0.5 MILLIGRAM(S): at 09:52

## 2025-07-23 RX ADMIN — Medication 1 APPLICATION(S): at 06:06

## 2025-07-23 RX ADMIN — Medication 0.5 MILLIGRAM(S): at 10:26

## 2025-07-24 ENCOUNTER — TRANSCRIPTION ENCOUNTER (OUTPATIENT)
Age: 89
End: 2025-07-24

## 2025-07-24 VITALS
RESPIRATION RATE: 16 BRPM | OXYGEN SATURATION: 100 % | DIASTOLIC BLOOD PRESSURE: 29 MMHG | TEMPERATURE: 98 F | HEART RATE: 78 BPM | SYSTOLIC BLOOD PRESSURE: 79 MMHG

## 2025-07-24 PROCEDURE — 99239 HOSP IP/OBS DSCHRG MGMT >30: CPT

## 2025-07-24 RX ORDER — LORAZEPAM 4 MG/ML
0.5 VIAL (ML) INJECTION
Qty: 0 | Refills: 0 | DISCHARGE
Start: 2025-07-24

## 2025-07-24 RX ORDER — HYDROMORPHONE/SOD CHLOR,ISO/PF 2 MG/10 ML
0.5 SYRINGE (ML) INJECTION
Qty: 0 | Refills: 0 | DISCHARGE
Start: 2025-07-24

## 2025-07-24 RX ADMIN — Medication 0.5 MILLIGRAM(S): at 09:13

## 2025-07-24 RX ADMIN — Medication 0.5 MILLIGRAM(S): at 08:54

## 2025-07-24 RX ADMIN — Medication 1 APPLICATION(S): at 04:59

## 2025-07-31 DIAGNOSIS — J96.01 ACUTE RESPIRATORY FAILURE WITH HYPOXIA: ICD-10-CM

## 2025-07-31 DIAGNOSIS — Z66 DO NOT RESUSCITATE: ICD-10-CM

## 2025-07-31 DIAGNOSIS — N17.9 ACUTE KIDNEY FAILURE, UNSPECIFIED: ICD-10-CM

## 2025-07-31 DIAGNOSIS — G93.41 METABOLIC ENCEPHALOPATHY: ICD-10-CM

## 2025-07-31 DIAGNOSIS — Z53.29 PROCEDURE AND TREATMENT NOT CARRIED OUT BECAUSE OF PATIENT'S DECISION FOR OTHER REASONS: ICD-10-CM

## 2025-07-31 DIAGNOSIS — E87.21 ACUTE METABOLIC ACIDOSIS: ICD-10-CM

## 2025-07-31 DIAGNOSIS — E43 UNSPECIFIED SEVERE PROTEIN-CALORIE MALNUTRITION: ICD-10-CM

## 2025-07-31 DIAGNOSIS — M19.90 UNSPECIFIED OSTEOARTHRITIS, UNSPECIFIED SITE: ICD-10-CM

## 2025-07-31 DIAGNOSIS — J96.02 ACUTE RESPIRATORY FAILURE WITH HYPERCAPNIA: ICD-10-CM

## 2025-07-31 DIAGNOSIS — B96.20 UNSPECIFIED ESCHERICHIA COLI [E. COLI] AS THE CAUSE OF DISEASES CLASSIFIED ELSEWHERE: ICD-10-CM

## 2025-07-31 DIAGNOSIS — R65.21 SEVERE SEPSIS WITH SEPTIC SHOCK: ICD-10-CM

## 2025-07-31 DIAGNOSIS — E83.42 HYPOMAGNESEMIA: ICD-10-CM

## 2025-07-31 DIAGNOSIS — R79.89 OTHER SPECIFIED ABNORMAL FINDINGS OF BLOOD CHEMISTRY: ICD-10-CM

## 2025-07-31 DIAGNOSIS — N13.6 PYONEPHROSIS: ICD-10-CM

## 2025-07-31 DIAGNOSIS — A41.51 SEPSIS DUE TO ESCHERICHIA COLI [E. COLI]: ICD-10-CM

## 2025-07-31 DIAGNOSIS — Z51.5 ENCOUNTER FOR PALLIATIVE CARE: ICD-10-CM
